# Patient Record
Sex: FEMALE | Race: WHITE | NOT HISPANIC OR LATINO | Employment: UNEMPLOYED | URBAN - NONMETROPOLITAN AREA
[De-identification: names, ages, dates, MRNs, and addresses within clinical notes are randomized per-mention and may not be internally consistent; named-entity substitution may affect disease eponyms.]

---

## 2017-02-10 ENCOUNTER — TRANSCRIBE ORDERS (OUTPATIENT)
Dept: LAB | Facility: MEDICAL CENTER | Age: 57
End: 2017-02-10

## 2017-02-10 ENCOUNTER — APPOINTMENT (OUTPATIENT)
Dept: LAB | Facility: MEDICAL CENTER | Age: 57
End: 2017-02-10
Payer: COMMERCIAL

## 2017-02-10 ENCOUNTER — HOSPITAL ENCOUNTER (OUTPATIENT)
Dept: RADIOLOGY | Facility: MEDICAL CENTER | Age: 57
Discharge: HOME | End: 2017-02-10
Payer: COMMERCIAL

## 2017-02-10 DIAGNOSIS — R07.81 RIB PAIN ON LEFT SIDE: ICD-10-CM

## 2017-02-10 DIAGNOSIS — R10.12 LEFT UPPER QUADRANT PAIN: ICD-10-CM

## 2017-02-10 DIAGNOSIS — D64.9 ANEMIA, UNSPECIFIED: ICD-10-CM

## 2017-02-10 DIAGNOSIS — E13.8 DIABETES MELLITUS OF OTHER TYPE WITH COMPLICATION: ICD-10-CM

## 2017-02-10 DIAGNOSIS — E78.5 HYPERLIPIDEMIA, UNSPECIFIED HYPERLIPIDEMIA TYPE: ICD-10-CM

## 2017-02-10 DIAGNOSIS — E87.8 ELECTROLYTE AND FLUID DISORDERS NOT ELSEWHERE CLASSIFIED: ICD-10-CM

## 2017-02-10 DIAGNOSIS — E13.8 DIABETES MELLITUS OF OTHER TYPE WITH COMPLICATION: Primary | ICD-10-CM

## 2017-02-10 DIAGNOSIS — E03.9 UNSPECIFIED HYPOTHYROIDISM: ICD-10-CM

## 2017-02-10 DIAGNOSIS — E55.9 UNSPECIFIED VITAMIN D DEFICIENCY: ICD-10-CM

## 2017-02-10 LAB
25(OH)D3 SERPL-MCNC: 21 NG/ML (ref 30–100)
ALBUMIN SERPL BCP-MCNC: 4 G/DL (ref 3.5–5)
ALP SERPL-CCNC: 100 U/L (ref 46–116)
ALT SERPL W P-5'-P-CCNC: 17 U/L (ref 12–78)
ANION GAP SERPL CALCULATED.3IONS-SCNC: 7 MMOL/L (ref 4–13)
AST SERPL W P-5'-P-CCNC: 11 U/L (ref 5–45)
BASOPHILS # BLD AUTO: 0.09 THOUSANDS/ΜL (ref 0–0.1)
BASOPHILS NFR BLD AUTO: 1 % (ref 0–1)
BILIRUB SERPL-MCNC: 0.53 MG/DL (ref 0.2–1)
BUN SERPL-MCNC: 14 MG/DL (ref 5–25)
CALCIUM SERPL-MCNC: 8.8 MG/DL (ref 8.3–10.1)
CHLORIDE SERPL-SCNC: 106 MMOL/L (ref 100–108)
CHOLEST SERPL-MCNC: 196 MG/DL (ref 50–200)
CO2 SERPL-SCNC: 25 MMOL/L (ref 21–32)
CREAT SERPL-MCNC: 0.79 MG/DL (ref 0.6–1.3)
EOSINOPHIL # BLD AUTO: 0.13 THOUSAND/ΜL (ref 0–0.61)
EOSINOPHIL NFR BLD AUTO: 1 % (ref 0–6)
ERYTHROCYTE [DISTWIDTH] IN BLOOD BY AUTOMATED COUNT: 13.4 % (ref 11.6–15.1)
EST. AVERAGE GLUCOSE BLD GHB EST-MCNC: 111 MG/DL
GFR SERPL CREATININE-BSD FRML MDRD: >60 ML/MIN/1.73SQ M
GLUCOSE SERPL-MCNC: 82 MG/DL (ref 65–140)
HBA1C MFR BLD: 5.5 % (ref 4.2–6.3)
HCT VFR BLD AUTO: 42.2 % (ref 34.8–46.1)
HDLC SERPL-MCNC: 41 MG/DL (ref 40–60)
HGB BLD-MCNC: 13.6 G/DL (ref 11.5–15.4)
INSULIN SERPL-ACNC: 11.8 MU/L (ref 3–25)
LDLC SERPL CALC-MCNC: 108 MG/DL (ref 0–100)
LYMPHOCYTES # BLD AUTO: 2.61 THOUSANDS/ΜL (ref 0.6–4.47)
LYMPHOCYTES NFR BLD AUTO: 29 % (ref 14–44)
MCH RBC QN AUTO: 29.2 PG (ref 26.8–34.3)
MCHC RBC AUTO-ENTMCNC: 32.2 G/DL (ref 31.4–37.4)
MCV RBC AUTO: 91 FL (ref 82–98)
MONOCYTES # BLD AUTO: 0.54 THOUSAND/ΜL (ref 0.17–1.22)
MONOCYTES NFR BLD AUTO: 6 % (ref 4–12)
NEUTROPHILS # BLD AUTO: 5.61 THOUSANDS/ΜL (ref 1.85–7.62)
NEUTS SEG NFR BLD AUTO: 63 % (ref 43–75)
NRBC BLD AUTO-RTO: 0 /100 WBCS
PLATELET # BLD AUTO: 380 THOUSANDS/UL (ref 149–390)
PMV BLD AUTO: 11.2 FL (ref 8.9–12.7)
POTASSIUM SERPL-SCNC: 4 MMOL/L (ref 3.5–5.3)
PROT SERPL-MCNC: 7.8 G/DL (ref 6.4–8.2)
RBC # BLD AUTO: 4.65 MILLION/UL (ref 3.81–5.12)
SODIUM SERPL-SCNC: 138 MMOL/L (ref 136–145)
TRIGL SERPL-MCNC: 237 MG/DL
TSH SERPL DL<=0.05 MIU/L-ACNC: 1.39 UIU/ML (ref 0.36–3.74)
VIT B12 SERPL-MCNC: 435 PG/ML (ref 100–900)
WBC # BLD AUTO: 8.99 THOUSAND/UL (ref 4.31–10.16)

## 2017-02-10 PROCEDURE — 80053 COMPREHEN METABOLIC PANEL: CPT

## 2017-02-10 PROCEDURE — 82306 VITAMIN D 25 HYDROXY: CPT

## 2017-02-10 PROCEDURE — 74020 HB X-RAY EXAM OF ABDOMEN (COMPLETE, WITH DECUBITUS/ERECT VIEWS): CPT

## 2017-02-10 PROCEDURE — 83525 ASSAY OF INSULIN: CPT

## 2017-02-10 PROCEDURE — 80061 LIPID PANEL: CPT

## 2017-02-10 PROCEDURE — 84443 ASSAY THYROID STIM HORMONE: CPT

## 2017-02-10 PROCEDURE — 85025 COMPLETE CBC W/AUTO DIFF WBC: CPT

## 2017-02-10 PROCEDURE — 83036 HEMOGLOBIN GLYCOSYLATED A1C: CPT

## 2017-02-10 PROCEDURE — 36415 COLL VENOUS BLD VENIPUNCTURE: CPT

## 2017-02-10 PROCEDURE — 82607 VITAMIN B-12: CPT

## 2017-02-10 PROCEDURE — 71020 HB CHEST X-RAY 2VW FRONTAL&LATL: CPT

## 2017-03-01 ENCOUNTER — TRANSCRIBE ORDERS (OUTPATIENT)
Dept: ADMINISTRATIVE | Facility: HOSPITAL | Age: 57
End: 2017-03-01

## 2017-03-01 DIAGNOSIS — Z12.31 VISIT FOR SCREENING MAMMOGRAM: Primary | ICD-10-CM

## 2017-06-11 ENCOUNTER — HOSPITAL ENCOUNTER (EMERGENCY)
Facility: HOSPITAL | Age: 57
Discharge: HOME | End: 2017-06-11
Attending: EMERGENCY MEDICINE | Admitting: EMERGENCY MEDICINE
Payer: COMMERCIAL

## 2017-06-11 VITALS
OXYGEN SATURATION: 96 % | TEMPERATURE: 98.1 F | HEIGHT: 62 IN | HEART RATE: 92 BPM | BODY MASS INDEX: 30.55 KG/M2 | SYSTOLIC BLOOD PRESSURE: 143 MMHG | DIASTOLIC BLOOD PRESSURE: 67 MMHG | RESPIRATION RATE: 18 BRPM | WEIGHT: 166 LBS

## 2017-06-11 DIAGNOSIS — S93.402A LEFT ANKLE SPRAIN: Primary | ICD-10-CM

## 2017-06-11 PROCEDURE — 96372 THER/PROPH/DIAG INJ SC/IM: CPT

## 2017-06-11 PROCEDURE — 99283 EMERGENCY DEPT VISIT LOW MDM: CPT

## 2017-06-11 RX ORDER — PRAVASTATIN SODIUM 10 MG
20 TABLET ORAL DAILY
COMMUNITY
End: 2018-03-20 | Stop reason: SDUPTHER

## 2017-06-11 RX ORDER — KETOROLAC TROMETHAMINE 30 MG/ML
15 INJECTION, SOLUTION INTRAMUSCULAR; INTRAVENOUS ONCE
Status: COMPLETED | OUTPATIENT
Start: 2017-06-11 | End: 2017-06-11

## 2017-06-11 RX ORDER — LISINOPRIL 30 MG/1
30 TABLET ORAL DAILY
COMMUNITY
End: 2018-03-20 | Stop reason: SDUPTHER

## 2017-06-11 RX ORDER — LORATADINE 10 MG/1
10 TABLET ORAL DAILY
COMMUNITY
End: 2018-03-20 | Stop reason: SDUPTHER

## 2017-06-11 RX ORDER — FLUTICASONE PROPIONATE 50 MCG
1 SPRAY, SUSPENSION (ML) NASAL DAILY
COMMUNITY
End: 2018-03-20 | Stop reason: SDUPTHER

## 2017-06-11 RX ORDER — TRAMADOL HYDROCHLORIDE 50 MG/1
50 TABLET ORAL EVERY 6 HOURS PRN
COMMUNITY
End: 2018-03-20 | Stop reason: SDUPTHER

## 2017-06-11 RX ORDER — BACLOFEN 10 MG/1
10 TABLET ORAL 3 TIMES DAILY
COMMUNITY
End: 2018-03-20 | Stop reason: SDUPTHER

## 2017-06-11 RX ORDER — IBUPROFEN 600 MG/1
600 TABLET ORAL EVERY 6 HOURS PRN
Qty: 30 TABLET | Refills: 0 | Status: SHIPPED | OUTPATIENT
Start: 2017-06-11 | End: 2018-06-26 | Stop reason: SDUPTHER

## 2017-06-11 RX ADMIN — KETOROLAC TROMETHAMINE 15 MG: 30 INJECTION, SOLUTION INTRAMUSCULAR at 17:26

## 2017-06-27 ENCOUNTER — ALLSCRIPTS OFFICE VISIT (OUTPATIENT)
Dept: OTHER | Facility: OTHER | Age: 57
End: 2017-06-27

## 2017-06-27 DIAGNOSIS — Z12.31 ENCOUNTER FOR SCREENING MAMMOGRAM FOR MALIGNANT NEOPLASM OF BREAST: ICD-10-CM

## 2017-06-27 PROCEDURE — 87624 HPV HI-RISK TYP POOLED RSLT: CPT | Performed by: NURSE PRACTITIONER

## 2017-06-27 PROCEDURE — G0145 SCR C/V CYTO,THINLAYER,RESCR: HCPCS | Performed by: NURSE PRACTITIONER

## 2017-06-29 ENCOUNTER — LAB REQUISITION (OUTPATIENT)
Dept: LAB | Facility: HOSPITAL | Age: 57
End: 2017-06-29
Payer: COMMERCIAL

## 2017-06-29 DIAGNOSIS — Z01.419 ENCOUNTER FOR GYNECOLOGICAL EXAMINATION WITHOUT ABNORMAL FINDING: ICD-10-CM

## 2017-07-06 LAB — HPV RRNA GENITAL QL NAA+PROBE: ABNORMAL

## 2017-07-12 LAB
LAB AP GYN PRIMARY INTERPRETATION: NORMAL
Lab: NORMAL
PATH INTERP SPEC-IMP: NORMAL

## 2017-08-10 ENCOUNTER — TRANSCRIBE ORDERS (OUTPATIENT)
Dept: LAB | Facility: MEDICAL CENTER | Age: 57
End: 2017-08-10

## 2017-08-10 ENCOUNTER — APPOINTMENT (OUTPATIENT)
Dept: LAB | Facility: MEDICAL CENTER | Age: 57
End: 2017-08-10
Payer: COMMERCIAL

## 2017-08-10 DIAGNOSIS — D64.9 ANEMIA, UNSPECIFIED: ICD-10-CM

## 2017-08-10 DIAGNOSIS — E78.5 OTHER AND UNSPECIFIED HYPERLIPIDEMIA: ICD-10-CM

## 2017-08-10 DIAGNOSIS — E88.81 DYSMETABOLIC SYNDROME X: ICD-10-CM

## 2017-08-10 DIAGNOSIS — E13.9 OTHER SPECIFIED DIABETES MELLITUS: Primary | ICD-10-CM

## 2017-08-10 DIAGNOSIS — E03.9 UNSPECIFIED HYPOTHYROIDISM: ICD-10-CM

## 2017-08-10 DIAGNOSIS — E53.8 OTHER B-COMPLEX DEFICIENCIES: ICD-10-CM

## 2017-08-10 DIAGNOSIS — E13.9 OTHER SPECIFIED DIABETES MELLITUS: ICD-10-CM

## 2017-08-10 DIAGNOSIS — E55.9 UNSPECIFIED VITAMIN D DEFICIENCY: ICD-10-CM

## 2017-08-10 DIAGNOSIS — E87.8 ELECTROLYTE AND FLUID DISORDERS NOT ELSEWHERE CLASSIFIED: ICD-10-CM

## 2017-08-10 LAB
25(OH)D3 SERPL-MCNC: 36 NG/ML (ref 30–100)
ALBUMIN SERPL BCP-MCNC: 4.1 G/DL (ref 3.5–5)
ALP SERPL-CCNC: 102 U/L (ref 46–116)
ALT SERPL W P-5'-P-CCNC: 17 U/L (ref 12–78)
ANION GAP SERPL CALCULATED.3IONS-SCNC: 8 MMOL/L (ref 4–13)
AST SERPL W P-5'-P-CCNC: 14 U/L (ref 5–45)
BASOPHILS # BLD AUTO: 0.07 THOUSANDS/ΜL (ref 0–0.1)
BASOPHILS NFR BLD AUTO: 1 % (ref 0–1)
BILIRUB SERPL-MCNC: 0.56 MG/DL (ref 0.2–1)
BUN SERPL-MCNC: 17 MG/DL (ref 5–25)
CALCIUM SERPL-MCNC: 9.1 MG/DL (ref 8.3–10.1)
CHLORIDE SERPL-SCNC: 106 MMOL/L (ref 100–108)
CHOLEST SERPL-MCNC: 211 MG/DL (ref 50–200)
CO2 SERPL-SCNC: 25 MMOL/L (ref 21–32)
CREAT SERPL-MCNC: 0.81 MG/DL (ref 0.6–1.3)
EOSINOPHIL # BLD AUTO: 0.2 THOUSAND/ΜL (ref 0–0.61)
EOSINOPHIL NFR BLD AUTO: 2 % (ref 0–6)
ERYTHROCYTE [DISTWIDTH] IN BLOOD BY AUTOMATED COUNT: 13.7 % (ref 11.6–15.1)
EST. AVERAGE GLUCOSE BLD GHB EST-MCNC: 114 MG/DL
GFR SERPL CREATININE-BSD FRML MDRD: 83 ML/MIN/1.73SQ M
GLUCOSE P FAST SERPL-MCNC: 85 MG/DL (ref 65–99)
HBA1C MFR BLD: 5.6 % (ref 4.2–6.3)
HCT VFR BLD AUTO: 43.9 % (ref 34.8–46.1)
HDLC SERPL-MCNC: 37 MG/DL (ref 40–60)
HGB BLD-MCNC: 14.3 G/DL (ref 11.5–15.4)
INSULIN SERPL-ACNC: 10.3 MU/L (ref 3–25)
LDLC SERPL CALC-MCNC: 124 MG/DL (ref 0–100)
LYMPHOCYTES # BLD AUTO: 2.71 THOUSANDS/ΜL (ref 0.6–4.47)
LYMPHOCYTES NFR BLD AUTO: 32 % (ref 14–44)
MCH RBC QN AUTO: 29.7 PG (ref 26.8–34.3)
MCHC RBC AUTO-ENTMCNC: 32.6 G/DL (ref 31.4–37.4)
MCV RBC AUTO: 91 FL (ref 82–98)
MONOCYTES # BLD AUTO: 0.69 THOUSAND/ΜL (ref 0.17–1.22)
MONOCYTES NFR BLD AUTO: 8 % (ref 4–12)
NEUTROPHILS # BLD AUTO: 4.92 THOUSANDS/ΜL (ref 1.85–7.62)
NEUTS SEG NFR BLD AUTO: 57 % (ref 43–75)
NRBC BLD AUTO-RTO: 0 /100 WBCS
PLATELET # BLD AUTO: 369 THOUSANDS/UL (ref 149–390)
PMV BLD AUTO: 11.3 FL (ref 8.9–12.7)
POTASSIUM SERPL-SCNC: 3.9 MMOL/L (ref 3.5–5.3)
PROT SERPL-MCNC: 7.9 G/DL (ref 6.4–8.2)
RBC # BLD AUTO: 4.82 MILLION/UL (ref 3.81–5.12)
SODIUM SERPL-SCNC: 139 MMOL/L (ref 136–145)
TRIGL SERPL-MCNC: 249 MG/DL
TSH SERPL DL<=0.05 MIU/L-ACNC: 1.02 UIU/ML (ref 0.36–3.74)
VIT B12 SERPL-MCNC: 396 PG/ML (ref 100–900)
WBC # BLD AUTO: 8.61 THOUSAND/UL (ref 4.31–10.16)

## 2017-08-10 PROCEDURE — 85025 COMPLETE CBC W/AUTO DIFF WBC: CPT

## 2017-08-10 PROCEDURE — 84443 ASSAY THYROID STIM HORMONE: CPT

## 2017-08-10 PROCEDURE — 83036 HEMOGLOBIN GLYCOSYLATED A1C: CPT

## 2017-08-10 PROCEDURE — 80061 LIPID PANEL: CPT

## 2017-08-10 PROCEDURE — 83525 ASSAY OF INSULIN: CPT

## 2017-08-10 PROCEDURE — 82306 VITAMIN D 25 HYDROXY: CPT

## 2017-08-10 PROCEDURE — 36415 COLL VENOUS BLD VENIPUNCTURE: CPT

## 2017-08-10 PROCEDURE — 82607 VITAMIN B-12: CPT

## 2017-08-10 PROCEDURE — 80053 COMPREHEN METABOLIC PANEL: CPT

## 2017-10-25 ENCOUNTER — TRANSCRIBE ORDERS (OUTPATIENT)
Dept: ADMINISTRATIVE | Facility: HOSPITAL | Age: 57
End: 2017-10-25

## 2017-10-25 DIAGNOSIS — R10.9 STOMACH ACHE: Primary | ICD-10-CM

## 2017-11-29 ENCOUNTER — HOSPITAL ENCOUNTER (OUTPATIENT)
Dept: ULTRASOUND IMAGING | Facility: HOSPITAL | Age: 57
Discharge: HOME | End: 2017-11-29
Payer: COMMERCIAL

## 2017-11-29 ENCOUNTER — HOSPITAL ENCOUNTER (OUTPATIENT)
Dept: MAMMOGRAPHY | Facility: HOSPITAL | Age: 57
Discharge: HOME | End: 2017-11-29
Payer: COMMERCIAL

## 2017-11-29 DIAGNOSIS — Z12.31 ENCOUNTER FOR SCREENING MAMMOGRAM FOR MALIGNANT NEOPLASM OF BREAST: ICD-10-CM

## 2017-11-29 PROCEDURE — G0202 SCR MAMMO BI INCL CAD: HCPCS

## 2017-11-29 PROCEDURE — 76642 ULTRASOUND BREAST LIMITED: CPT

## 2017-11-29 PROCEDURE — 77063 BREAST TOMOSYNTHESIS BI: CPT

## 2017-12-07 NOTE — PROGRESS NOTES
Left a message for Ms Gonzalez on 15/66/29 after a biopsy was recommended for her on 11/29/17  Wanted to assist in scheduling her biopsy  She did not return call  Spoke with Milka Butler at [facility] office on 12/6/17 and informed her Ms Wild Alberto did not return my call to schedule her recommended biopsy or schedule an appointment with a surgeon to my knowledge

## 2017-12-20 ENCOUNTER — ALLSCRIPTS OFFICE VISIT (OUTPATIENT)
Dept: OTHER | Facility: OTHER | Age: 57
End: 2017-12-20

## 2017-12-20 DIAGNOSIS — N63.20 MASS OF LEFT BREAST: ICD-10-CM

## 2017-12-21 NOTE — CONSULTS
Assessment   1  Breast mass, left (663 54) (N63 20)    Plan   Breast mass, left    · US GUIDED BREAST BIOPSY LEFT COMPLETE; Status:Hold For - Scheduling;    Requested for:33Als4845;    Perform:[facility]; Due:17Rpb1559; Ordered; For:Breast mass, left; Ordered By:Vinny Rainey;   · Follow-up visit in 2 weeks Evaluation and Treatment  Follow-up  Status: Hold For -    Scheduling  Requested for: 11Gks2373   Ordered; For: Breast mass, left; Ordered By: Maria Elena Stover Performed:  Due: 37COX5173    Discussion/Summary   Discussion Summary:    Left breast mass / cyst, aspirated today  It is unclear whether this is the primary problem versus a cyst masking the problem  We will therefore set her for ultrasound-guided biopsy of left breast mass presuming is still there on preprocedural ultrasound  I will see her in 2 weeks after the results are available for review  Further management to be determined based on results of study  Goals and Barriers: The patient has the current Goals: Diagnosis  The patent has the current Barriers:    Patient's Capacity to Self-Care: Patient is able to Self-Care  Patient agrees and allows to involve family/caregiver in development of care plan:      Chief Complaint   Chief Complaint Free Text Note Form: Patient here for surgical consult left breast mass      History of Present Illness   HPI: Patient is a 28-year-old woman who was in her usual state of health until a few weeks ago when she underwent her mammogram  Right breast was clear  The left breast was notable for a cyst  Since the mammogram, she notes that the swelling left breast has progressively enlarged  She comes in for evaluation and treatment  The mammogram and ultrasound done at the time were deemed BI-RADS 5 and therefore biopsy was recommended  She denies a prior history of breast cancer or breast biopsies  She has denies nipple discharge  She has never had a breast biopsy   She does perform monthly self-breast exams  Age of menarche 17  no pregnancies  She has birth control pills for 29 years  Never used hormone replacement therapy  Her mother had breast cancer in her 62s  fevers, chills or evidence of infection  Review of Systems   Complete Female ROS SurgOnc:      Constitutional: The patient denies new or recent history of general fatigue, no recent weight loss, no change in appetite  Eyes: No complaints of visual problems, no scleral icterus  ENT: no complaints of ear pain, no hoarseness, no difficulty swallowing,-- no tinnitus-- and-- no new masses in head, oral cavity, or neck  Cardiovascular: No complaints of chest pain, no palpitations, no ankle edema  Respiratory: No complaints of shortness of breath, no cough  Gastrointestinal: No complaints of jaundice, no bloody stools, no pale stools  Genitourinary: No complaints of dysuria, no hematuria, no nocturia, no frequent urination, no urethral discharge  Musculoskeletal: No complaints of weakness, paralysis, joint stiffness or arthralgias,  Integumentary: No complaints of rash, no new lesions  Neurological: No complaints of convulsions, no seizures, no dizziness  Hematologic/Lymphatic: as noted in HPI  ROS Reviewed:    ROS reviewed  Active Problems   1  Bilateral knee pain (719 46) (M25 561,M25 562)   2  Bilateral leg weakness (729 89) (R29 898)   3  Encounter for gynecological examination with Papanicolaou smear of cervix (V72 31)     (Z01 419)   4  Encounter for screening mammogram for malignant neoplasm of breast (V76 12)     (Z12 31)   5  Joint pain (719 40) (M25 50)   6  Myalgia (729 1) (M79 1)    Past Medical History   Active Problems And Past Medical History Reviewed: The active problems and past medical history were reviewed and updated today  Surgical History   1  History of Appendectomy  Surgical History Reviewed: The surgical history was reviewed and updated today         Family History   Mother    1  No pertinent family history  Family History    2  Maternal history of Breast cancer (174 9) (C50 919)   3  Maternal grandfather's history of Diabetes (250 00) (E11 9)   4  Paternal history of Hypertension (401 9) (I10)   5  Maternal history of Uterine cancer (179) (C55)  Family History Reviewed: The family history was reviewed and updated today  Social History    · Current smoker (305 1) (F17 200)   · Daily caffeinated cola consumption   · Drinks coffee   · Tea  Social History Reviewed: The social history was reviewed and updated today  The social history was reviewed and is unchanged  Current Meds    1  Baclofen 10 MG Oral Tablet; Therapy: (Recorded:90Xru4249) to Recorded   2  Calcium 600+D Plus Minerals 600-400 MG-UNIT Oral Tablet; Therapy: (Recorded:00Wyu9964) to Recorded   3  Fluticasone Propionate 50 MCG/ACT Nasal Suspension; Therapy: (Recorded:85Hqa8523) to Recorded   4  Lisinopril 30 MG Oral Tablet; Therapy: (Recorded:38Xka4376) to Recorded   5  Loratadine 10 MG Oral Capsule; Therapy: (Recorded:72Yiu8378) to Recorded   6  Lovastatin 20 MG Oral Tablet; Therapy: (Recorded:97Mdb2499) to Recorded   7  RaNITidine HCl - 150 MG Oral Tablet; Therapy: (Recorded:10Bmc4939) to Recorded   8  Tab-A-Theodore TABS; Therapy: (Recorded:98Wam0626) to Recorded   9  TraMADol HCl - 50 MG Oral Tablet; Therapy: (Recorded:22Kat1177) to Recorded   10  Vitamin D TABS; Therapy: (Recorded:31Ixe4223) to Recorded   11  V-R Vitamin B-12 TABS; Therapy: (Recorded:40Xfg5541) to Recorded  Medication List Reviewed: The medication list was reviewed and updated today  Allergies   1  No Known Drug Allergies  2  No Known Environmental Allergies   3   No Known Food Allergies    Vitals   Vital Signs    Recorded: 99Cvs9520 03:13PM   Heart Rate 74   Respiration 16   Systolic 065   Diastolic 442   Height 5 ft 2 in   Weight 152 lb    BMI Calculated 27 8   BSA Calculated 1 7   O2 Saturation 98     Physical Exam        HEENT: Head and face: Normal  -- LILIANA, EOMI and the sclerae are anicteric  -- External inspection of ears and nose: Normal  -- There is no appreciable cervical adenopathy   -- There are no carotid bruits  -- Neck is supple without adenopathy  Chest: There are bilaterally symmetrical breath sounds  -- Palpation of heart: Normal PMI, no thrills  -- There is a RRR, normal S1 and S2, without murmurs, rub or gallop  Abdomen: The abdomen is normal to inspection and percussion  It is soft, non-tender  There are normal bowel sounds  There are no abnormal masses  Extremities: Gait and station: Normal  -- There is no clubbing or cyanosis  -- Inspection/palpation of joints, bones, and muscles: Normal  -- There is no edema  -- Sensation is intact to light touch  Neuro: Grossly nonfocal  -- Reflexes: 2+ and symmetric  -- Motor strength: Normal motor strength    -- Orientation to person, place and time: Normal  -- Mood and affect: Normal        Lymphatic: no evidence of cervical adenopathy bilaterally  -- no evidence of axillary adenopathy bilaterally  Skin: Examination of Breast: Abnormal        Results/Data   Diagnostic Studies Reviewed Surg Onc:      X-ray Review MAMMO SCREENING BILATERAL W 3D & CAD Final   Documents Attached   Order Number: [id number]   - Patient Instructions: To schedule this appointment, please contact Central Scheduling at [phone]  Do not wear any perfume, powder, lotion or deodorant on breast or underarm area  Please bring your doctors order, referral (if needed) and insurance information with you on the day of the test  Failure to bring this information may result in this test being rescheduled  Arrive 15 minutes prior to your appointment time to register  On the day of your test, please bring any prior mammogram or breast studies with you that were not performed at a [facility]   Failure to bring prior exams may result in your test needing to be rescheduled  Patient History:   Patient is postmenopausal and is nulliparous  Family history of breast cancer in mother  Reason for exam: high-risk patient  Mammo Screening Bilateral W DBT and CAD: November 29, 2017 -   Check In #: [de-identified]   2D/3D Procedure   3D views: Bilateral MLO view(s) were taken  2D views: Bilateral CC view(s) were taken  XCCL view(s) were   taken of the left breast        Technologist: DADA Hendrix (DADA)(M)   Prior study comparison: May 26, 2016, mammo diagnostic left W   CAD, performed at [address]  May 26, 2016, left breast   ultrasound, performed at [facility]  May 5, 2016, mammo   screening bilateral W CAD, performed at [facility]  August 25, 2008, mammo diagnostic right W CAD, performed at [address] [place] [address]  August 21, 2008, mammo screening bilateral W CAD,   performed at [address] [facility]  There are scattered fibroglandular densities  No dominant soft tissue mass, architectural distortion or   suspicious calcifications are noted in the right breast  The   skin and nipple contours are within normal limits  Dominant mass is present in the outer left breast  Targeted   ultrasound recommended  US Breast Left Limited: November 29, 2017 - Check In #: [de-identified]   Standard views  Technologist: Leatha Burrows, sonographer AKIRA CALDERA     ACR BI-RADS? ?? Assessments: BiRad:0 - Incomplete: needs additional   imaging evaluation (Overall)   3-D Scrn Mammo: BiRad:0 - incomplete: needs additional imaging   evaluation  A breast health care nurse from our facility will be contacting   the patient regarding the need for additional imaging  Recommendation:   Further imaging of the left breast      The patient is scheduled in a reminder system for screening   mammography       Transcription Location: [facility] 98: [id number]     Risk Value(s):   Neno 10 Year: 7 000%, Neno Lifetime: 22 000%,   Myriad Table: 1 5%, BIBIANA 5 Year: 2 3%, NCI Lifetime: 15 5%        by: Jose David Castanon Collected/Examined: [id number] 12:47PM  by: Jose David Castanon [id number] 09:57AM  Communication: No patient communication needed at this time; Final Resulted: 12ALF8001 02:14PM Last Updated: 71XTR5578 09:57AM Accession: [id number]      *US BREAST LEFT LIMITED (DIAGNOSTIC) Amended   Documents Attached   Order Number: [id number]   - Patient Instructions: To schedule this appointment, please contact Central Scheduling at [phone]  Do not wear any perfume, powder, lotion or deodorant on breast or underarm area  Please bring your doctors order, referral (if needed) and insurance information with you on the day of the test  Failure to bring this information may result in this test being rescheduled  Arrive 15 minutes prior to your appointment time to register  On the day of your test, please bring any prior mammogram or breast studies with you that were not performed at a [facility]  Failure to bring prior exams may result in your test needing to be rescheduled  Patient History:   Patient is postmenopausal and is nulliparous  Family history of breast cancer in mother  Reason for exam: high-risk patient  US Breast Left Limited: November 29, 2017 - Check In #: [de-identified]   Standard views  Technologist: Peggy Scott, sonographer WERNER, LEOT   Prior study comparison: May 26, 2016, mammo diagnostic left W   CAD, performed at [facility]  May 26, 2016, left breast   ultrasound, performed at [facility]  May 5, 2016, mammo   screening bilateral W CAD, performed at [facility]  August 25, 2008, mammo diagnostic right W CAD, performed at [address] [place] [address]  August 21, 2008, mammo screening bilateral W CAD,   performed at [facility]         INDICATION: Callback for dominant mass in the outer left breast     FINDINGS: Targeted ultrasound targeted ultrasound through the 3 o'clock position of the left breast, 14 cm from the nipple   demonstrates a large (greater than 5 cm) predominantly cystic   mass correlating with a previously described mass recommended for   biopsy  No grossly suspicious axillary lymphadenopathy is   identified  ACR BI-RADS? ?? Assessments: BiRad:5 - Highly suggestive of   malignancy     Recommendation:   Ultrasound-guided biopsy of the left breast      The patient is scheduled in a reminder system for screening   mammography  Transcription Location: [facility] 98: [id number]     Risk Value(s):   Tyrer-Cuzick 10 Year: 7 000%, Tyrer-Cuzick Lifetime: 22 000%,   Myriad Table: 1 5%, BIBIANA 5 Year: 2 3%, NCI Lifetime: 15 5%   Patient History:   Patient is postmenopausal and is nulliparous  Family history of breast cancer in mother  Reason for exam: high-risk patient  Mammo Screening Bilateral W DBT and CAD: November 29, 2017 -   Check In #: [de-identified]   2D/3D Procedure   3D views: Bilateral MLO view(s) were taken  2D views: Bilateral CC view(s) were taken  XCCL view(s) were   taken of the left breast        Technologist: DADA Edouard (DADA)(M)   Prior study comparison: May 26, 2016, mammo diagnostic left W   CAD, performed at [facility]  May 26, 2016, left breast   ultrasound, performed at [facility]  May 5, 2016, mammo   screening bilateral W CAD, performed at [facility]  August 25, 2008, mammo diagnostic right W CAD, performed at [address] [place] [address]  August 21, 2008, mammo screening bilateral W CAD,   performed at [facility]  There are scattered fibroglandular densities  No dominant soft tissue mass, architectural distortion or   suspicious calcifications are noted in the right breast  The   skin and nipple contours are within normal limits  Dominant mass is present in the outer left breast  Targeted   ultrasound recommended       US Breast Left Limited: November 29, 2017 - Check In #: [de-identified]   Standard views  Technologist: Magi Dickinson, sonographer AKIRA CALDERA     ACR BI-RADS? ?? Assessments: BiRad:0 - Incomplete: needs additional   imaging evaluation (Overall)   3-D Scrn Mammo: BiRad:0 - incomplete: needs additional imaging   evaluation  A breast health care nurse from our facility will be contacting   the patient regarding the need for additional imaging  Recommendation:   Further imaging of the left breast      The patient is scheduled in a reminder system for screening   mammography  Transcription Location: [facility] 98: [id number]     Risk Value(s):   Tyrer-Cuzick 10 Year: 7 000%, Tyrer-Cuzick Lifetime: 22 000%,   Myriad Table: 1 5%, BIBIANA 5 Year: 2 3%, NCI Lifetime: 15 5%        by: Héctor Rivas Collected/Examined: [id number] 01:51PM  by: Héctor Rivas [id number] 12:39PM  Communication: No patient communication needed at this time; Amended Resulted: 52SEZ4646 11:48AM Last Updated: 07RUE9310 12:39PM Accession: [id number]  CT Scan Review MAMMO SCREENING BILATERAL W 3D & CAD Final   Documents Attached   Order Number: [id number]   - Patient Instructions: To schedule this appointment, please contact Central Scheduling at [phone]  Do not wear any perfume, powder, lotion or deodorant on breast or underarm area  Please bring your doctors order, referral (if needed) and insurance information with you on the day of the test  Failure to bring this information may result in this test being rescheduled  Arrive 15 minutes prior to your appointment time to register  On the day of your test, please bring any prior mammogram or breast studies with you that were not performed at a [facility]  Failure to bring prior exams may result in your test needing to be rescheduled  Patient History:   Patient is postmenopausal and is nulliparous  Family history of breast cancer in mother  Reason for exam: high-risk patient       Mammo Screening Bilateral W DBT and CAD: November 29, 2017 -   Check In #: [de-identified]   2D/3D Procedure   3D views: Bilateral MLO view(s) were taken  2D views: Bilateral CC view(s) were taken  XCCL view(s) were   taken of the left breast        Technologist: DADA Oliveira (R)(M)   Prior study comparison: May 26, 2016, mammo diagnostic left W   CAD, performed at [facility]  May 26, 2016, left breast   ultrasound, performed at [facility]  May 5, 2016, mammo   screening bilateral W CAD, performed at [facility]  August 25, 2008, mammo diagnostic right W CAD, performed at [address] [place] [address]  August 21, 2008, mammo screening bilateral W CAD,   performed at [facility] [employer] [facility]  There are scattered fibroglandular densities  No dominant soft tissue mass, architectural distortion or   suspicious calcifications are noted in the right breast  The   skin and nipple contours are within normal limits  Dominant mass is present in the outer left breast  Targeted   ultrasound recommended  US Breast Left Limited: November 29, 2017 - Check In #: [de-identified]   Standard views  Technologist: Paradise Skelton, sonographer WERNER, AKIRA     ACR BI-RADS? ?? Assessments: BiRad:0 - Incomplete: needs additional   imaging evaluation (Overall)   3-D Scrn Mammo: BiRad:0 - incomplete: needs additional imaging   evaluation  A breast health care nurse from our facility will be contacting   the patient regarding the need for additional imaging  Recommendation:   Further imaging of the left breast      The patient is scheduled in a reminder system for screening   mammography  Transcription Location: [facility] 98: [id number]     Risk Value(s):   Tyrer-Cuzick 10 Year: 7 000%, Tyrer-Cuzick Lifetime: 22 000%,   Myriad Table: 1 5%, BIBIANA 5 Year: 2 3%, NCI Lifetime: 15 5%        by: Juwan Hernandez Collected/Examined: [id number] 12:47PM  by: Juwan Hernandez [id number] 09:57AM  Communication: No patient communication needed at this time;  Final Resulted: [id number] 02:14PM Last Updated: 00UQY6206 09:57AM Accession: [id number]  Procedure   After consent was obtained, the overlying skin of the left breast was anesthetized and prepped  It was then aspirated with a 16 gauge needle  Approximately 70 cc of serosanguineous fluid was evacuated from the cyst cavity until clinical resolution of the mass/collection  A Band-Aid was placed afterwards        Signatures    Electronically signed by : Gracie Carlos MD; Dec 20 2017  4:01PM EST                       (Author)

## 2018-01-03 ENCOUNTER — GENERIC CONVERSION - ENCOUNTER (OUTPATIENT)
Dept: OTHER | Facility: OTHER | Age: 58
End: 2018-01-03

## 2018-01-14 VITALS — WEIGHT: 156.38 LBS | BODY MASS INDEX: 28.6 KG/M2 | DIASTOLIC BLOOD PRESSURE: 90 MMHG | SYSTOLIC BLOOD PRESSURE: 138 MMHG

## 2018-01-23 VITALS
DIASTOLIC BLOOD PRESSURE: 120 MMHG | BODY MASS INDEX: 27.97 KG/M2 | RESPIRATION RATE: 16 BRPM | OXYGEN SATURATION: 98 % | WEIGHT: 152 LBS | HEIGHT: 62 IN | SYSTOLIC BLOOD PRESSURE: 200 MMHG | HEART RATE: 74 BPM

## 2018-01-24 VITALS
BODY MASS INDEX: 27.97 KG/M2 | WEIGHT: 152 LBS | DIASTOLIC BLOOD PRESSURE: 90 MMHG | HEIGHT: 62 IN | SYSTOLIC BLOOD PRESSURE: 160 MMHG | OXYGEN SATURATION: 98 % | HEART RATE: 115 BPM | RESPIRATION RATE: 16 BRPM

## 2018-03-01 ENCOUNTER — TELEPHONE (OUTPATIENT)
Dept: FAMILY MEDICINE CLINIC | Facility: CLINIC | Age: 58
End: 2018-03-01

## 2018-03-01 DIAGNOSIS — H65.06 RECURRENT ACUTE SEROUS OTITIS MEDIA OF BOTH EARS: Primary | ICD-10-CM

## 2018-03-01 RX ORDER — AMOXICILLIN 500 MG/1
500 CAPSULE ORAL EVERY 8 HOURS SCHEDULED
Qty: 30 CAPSULE | Refills: 1 | Status: SHIPPED | OUTPATIENT
Start: 2018-03-01 | End: 2018-03-11

## 2018-03-01 NOTE — TELEPHONE ENCOUNTER
Rx for Amoxicillin 500 mg every 8 hours x 10 days #30 #1ref escribed to [address] per patient request to have on hand for her ear infections

## 2018-03-19 RX ORDER — CHOLECALCIFEROL (VITAMIN D3) 1250 MCG
1 CAPSULE ORAL 2 TIMES WEEKLY
COMMUNITY
End: 2018-03-20 | Stop reason: SDUPTHER

## 2018-03-19 RX ORDER — LANOLIN ALCOHOL/MO/W.PET/CERES
1 CREAM (GRAM) TOPICAL DAILY
COMMUNITY
End: 2018-03-20 | Stop reason: SDUPTHER

## 2018-03-19 RX ORDER — RANITIDINE 150 MG/1
1 TABLET ORAL 2 TIMES DAILY
COMMUNITY
End: 2018-03-20 | Stop reason: SDUPTHER

## 2018-03-20 ENCOUNTER — OFFICE VISIT (OUTPATIENT)
Dept: FAMILY MEDICINE CLINIC | Facility: CLINIC | Age: 58
End: 2018-03-20
Payer: COMMERCIAL

## 2018-03-20 DIAGNOSIS — R01.1 CARDIAC MURMUR: ICD-10-CM

## 2018-03-20 DIAGNOSIS — M54.50 CHRONIC BILATERAL LOW BACK PAIN WITHOUT SCIATICA: ICD-10-CM

## 2018-03-20 DIAGNOSIS — R73.9 HYPERGLYCEMIA: ICD-10-CM

## 2018-03-20 DIAGNOSIS — E55.9 VITAMIN D DEFICIENCY: ICD-10-CM

## 2018-03-20 DIAGNOSIS — G47.09 OTHER INSOMNIA: ICD-10-CM

## 2018-03-20 DIAGNOSIS — D50.9 IRON DEFICIENCY ANEMIA, UNSPECIFIED IRON DEFICIENCY ANEMIA TYPE: Primary | ICD-10-CM

## 2018-03-20 DIAGNOSIS — J30.2 CHRONIC SEASONAL ALLERGIC RHINITIS, UNSPECIFIED TRIGGER: ICD-10-CM

## 2018-03-20 DIAGNOSIS — S21.002S BREAST WOUND, LEFT, SEQUELA: ICD-10-CM

## 2018-03-20 DIAGNOSIS — E78.2 MIXED HYPERLIPIDEMIA: ICD-10-CM

## 2018-03-20 DIAGNOSIS — K21.9 GASTROESOPHAGEAL REFLUX DISEASE, ESOPHAGITIS PRESENCE NOT SPECIFIED: ICD-10-CM

## 2018-03-20 DIAGNOSIS — G89.29 CHRONIC BILATERAL LOW BACK PAIN WITHOUT SCIATICA: ICD-10-CM

## 2018-03-20 DIAGNOSIS — Z00.00 HEALTHCARE MAINTENANCE: ICD-10-CM

## 2018-03-20 DIAGNOSIS — Z13.29 SCREENING FOR HYPOTHYROIDISM: ICD-10-CM

## 2018-03-20 DIAGNOSIS — Z86.79 HISTORY OF MITRAL VALVE DISORDER: ICD-10-CM

## 2018-03-20 DIAGNOSIS — I10 ESSENTIAL HYPERTENSION: ICD-10-CM

## 2018-03-20 DIAGNOSIS — E53.8 VITAMIN B 12 DEFICIENCY: ICD-10-CM

## 2018-03-20 PROCEDURE — 99204 OFFICE O/P NEW MOD 45 MIN: CPT | Performed by: NURSE PRACTITIONER

## 2018-03-20 RX ORDER — BACLOFEN 10 MG/1
10 TABLET ORAL 3 TIMES DAILY
Qty: 90 TABLET | Refills: 5 | Status: SHIPPED | OUTPATIENT
Start: 2018-03-20 | End: 2018-06-26 | Stop reason: ALTCHOICE

## 2018-03-20 RX ORDER — LANOLIN ALCOHOL/MO/W.PET/CERES
3 CREAM (GRAM) TOPICAL
Qty: 30 TABLET | Refills: 5 | Status: SHIPPED | OUTPATIENT
Start: 2018-03-20 | End: 2018-06-26 | Stop reason: SDUPTHER

## 2018-03-20 RX ORDER — CHOLECALCIFEROL (VITAMIN D3) 1250 MCG
1 CAPSULE ORAL 2 TIMES WEEKLY
Qty: 8 CAPSULE | Refills: 5 | Status: SHIPPED | OUTPATIENT
Start: 2018-03-22 | End: 2018-06-26 | Stop reason: SDUPTHER

## 2018-03-20 RX ORDER — TRAMADOL HYDROCHLORIDE 50 MG/1
50 TABLET ORAL EVERY 6 HOURS PRN
Qty: 120 TABLET | Refills: 2 | Status: SHIPPED | OUTPATIENT
Start: 2018-03-20 | End: 2018-06-26 | Stop reason: ALTCHOICE

## 2018-03-20 RX ORDER — LISINOPRIL 30 MG/1
30 TABLET ORAL DAILY
Qty: 30 TABLET | Refills: 5 | Status: SHIPPED | OUTPATIENT
Start: 2018-03-20 | End: 2018-06-26 | Stop reason: SDUPTHER

## 2018-03-20 RX ORDER — LANOLIN ALCOHOL/MO/W.PET/CERES
1000 CREAM (GRAM) TOPICAL DAILY
Qty: 30 TABLET | Refills: 5 | Status: SHIPPED | OUTPATIENT
Start: 2018-03-20 | End: 2018-06-26 | Stop reason: SDUPTHER

## 2018-03-20 RX ORDER — LORATADINE 10 MG/1
10 TABLET ORAL DAILY
Qty: 30 TABLET | Refills: 5 | Status: SHIPPED | OUTPATIENT
Start: 2018-03-20 | End: 2018-06-26 | Stop reason: SDUPTHER

## 2018-03-20 RX ORDER — PRAVASTATIN SODIUM 10 MG
20 TABLET ORAL DAILY
Qty: 60 TABLET | Refills: 5 | Status: SHIPPED | OUTPATIENT
Start: 2018-03-20 | End: 2018-04-02 | Stop reason: CLARIF

## 2018-03-20 RX ORDER — ELECTROLYTES/DEXTROSE
1 SOLUTION, ORAL ORAL DAILY
Qty: 30 TABLET | Refills: 5 | Status: SHIPPED | OUTPATIENT
Start: 2018-03-20 | End: 2018-06-26 | Stop reason: SDUPTHER

## 2018-03-20 RX ORDER — LANOLIN ALCOHOL/MO/W.PET/CERES
3 CREAM (GRAM) TOPICAL
COMMUNITY
End: 2018-03-20 | Stop reason: SDUPTHER

## 2018-03-20 RX ORDER — RANITIDINE 150 MG/1
150 TABLET ORAL 2 TIMES DAILY
Qty: 60 TABLET | Refills: 5 | Status: SHIPPED | OUTPATIENT
Start: 2018-03-20 | End: 2018-06-26 | Stop reason: SDUPTHER

## 2018-03-20 RX ORDER — CLINDAMYCIN HYDROCHLORIDE 300 MG/1
300 CAPSULE ORAL 4 TIMES DAILY
Qty: 40 CAPSULE | Refills: 1 | Status: SHIPPED | OUTPATIENT
Start: 2018-03-20 | End: 2018-03-30

## 2018-03-20 RX ORDER — FLUTICASONE PROPIONATE 50 MCG
1 SPRAY, SUSPENSION (ML) NASAL DAILY
Qty: 16 G | Refills: 0 | Status: SHIPPED | OUTPATIENT
Start: 2018-03-20 | End: 2018-06-26 | Stop reason: SDUPTHER

## 2018-03-20 NOTE — PROGRESS NOTES
Assessment/Plan:      Diagnoses and all orders for this visit:    Iron deficiency anemia, unspecified iron deficiency anemia type  -     CBC and differential; Future    Screening for hypothyroidism  -     TSH, 3rd generation with T4 reflex; Future    Mixed hyperlipidemia  -     Lipid panel; Future  -     pravastatin (PRAVACHOL) 10 mg tablet; Take 2 tablets (20 mg total) by mouth daily    Vitamin B 12 deficiency  -     Vitamin B12; Future  -     cyanocobalamin (VITAMIN B-12) 1,000 mcg tablet; Take 1 tablet (1,000 mcg total) by mouth daily    Vitamin D deficiency  -     Vitamin D 25 hydroxy  -     Cholecalciferol (VITAMIN D3) 07056 units CAPS; Take 1 capsule (50,000 Units total) by mouth 2 (two) times a week  -     Calcium Carbonate-Vitamin D3 (CALCIUM 600/VITAMIN D) 600-400 MG-UNIT TABS; Take 1 tablet by mouth 2 (two) times a day    Hyperglycemia  -     Comprehensive metabolic panel; Future  -     HEMOGLOBIN A1C W/ EAG ESTIMATION; Future  -     Insulin, fasting; Future    History of mitral valve disorder  -     Echo limited with contrast if indicated; Future    Cardiac murmur  -     Echo limited with contrast if indicated; Future    Breast wound, left, sequela  -     Ambulatory referral to General Surgery; Future  -     clindamycin (CLEOCIN) 300 MG capsule; Take 1 capsule (300 mg total) by mouth 4 (four) times a day for 10 days  -     traMADol (ULTRAM) 50 mg tablet; Take 1 tablet (50 mg total) by mouth every 6 (six) hours as needed for moderate pain    Chronic bilateral low back pain without sciatica  -     traMADol (ULTRAM) 50 mg tablet; Take 1 tablet (50 mg total) by mouth every 6 (six) hours as needed for moderate pain  -     baclofen 10 mg tablet; Take 1 tablet (10 mg total) by mouth 3 (three) times a day    Gastroesophageal reflux disease, esophagitis presence not specified  -     ranitidine (ZANTAC) 150 mg tablet;  Take 1 tablet (150 mg total) by mouth 2 (two) times a day    Healthcare maintenance  - Multiple Vitamins-Minerals (MULTIVITAMIN ADULT) TABS; Take 1 tablet by mouth daily    Other insomnia  -     melatonin 3 mg; Take 1 tablet (3 mg total) by mouth daily at bedtime    Chronic seasonal allergic rhinitis, unspecified trigger  -     loratadine (CLARITIN) 10 mg tablet; Take 1 tablet (10 mg total) by mouth daily  -     fluticasone (FLONASE) 50 mcg/act nasal spray; 1 spray into each nostril daily    Essential hypertension  -     lisinopril (ZESTRIL) 30 mg tablet; Take 1 tablet (30 mg total) by mouth daily    Other orders  -     Discontinue: cyanocobalamin (VITAMIN B-12) 1,000 mcg tablet; Take 1 tablet by mouth daily  -     Discontinue: Cholecalciferol (VITAMIN D3) 25811 units CAPS; Take 1 capsule by mouth 2 (two) times a week  -     Discontinue: Multiple Vitamins-Minerals (MULTIVITAMIN ADULT PO); Take 1 tablet by mouth daily  -     Discontinue: ranitidine (ZANTAC) 150 mg tablet; Take 1 tablet by mouth 2 (two) times a day  -     Discontinue: Calcium Carbonate-Vitamin D3 (CALCIUM 600/VITAMIN D) 600-400 MG-UNIT TABS; Take 1 tablet by mouth 2 (two) times a day  -     Discontinue: melatonin 3 mg; Take 3 mg by mouth daily at bedtime          Subjective:     Patient ID: Chris Patterson is a 56 y o  female  Patient presents to office for initial physical exam and to establish care at [place] [id number]  Complete history and medications reviewed with patient and tolerating all medications well  Patient had Mammogram completed in November 2017 which suggested biopsy of left breast cyst/mass  Patient had I&D done of left breast cyst in Dec  2017 by [age] [facility] Surgeon Dr Kraig Nino who suggested patient have a biopsy done of left breast mass along with follow up Mammogram but patient is refusing to have a biopsy or Mammogram done  Patient instructed on importance of scheduling appointment for Biopsy and Mammogram   Patient C/O left breast cyst I&D site still has a hole with bloody drainage continuing  Patient was evaluated at [facility] 2/13/18 for the cyst of left breast draining blood  [facility] labwork which showed elevated WBCs and prescribed antibiotics  Patient did not have her labwork completed yet  Patient C/O sinus congestion for brown/green mucous, B/L ear pressure and pain, did have sore throat a couple days, and productive cough for green mucous ongoing for past week  Patient never had her Echocardiogram done as ordered  Review of Systems    GENERAL:  Feels well, denies any significant changes in weight without trying  SKIN:  Denies rashes, lesions, opened areas, wounds, change in moles or any other skin changes  Opened are of left lateral breast from recent breast cyst I&D  HEENT:  Denies any head injury or headaches  Negative blurred vision, floaters, spots before eyes, infections, or other vision problems  Negative significant changes in vision or hearing  Negative tinnitus, vertigo, or infections  Negative hay fever, sinus trouble, nasal discharge, bloody noses, or problems with smell  Negative sore throat, bleeding gums, ulcers, or sores  Glasses/Contacts: Glasses  Hearing Aids: NO  Dentures/Partials/Implants: NO  NECK:  Denies lumps, goiter, pain, swollen glands, or lymphadenopathy  BREASTS:  Denies lumps, pain, nipple discharge, swelling, redness, or any other changes  C/O hole in left breast from past I&D site with bloody drainage  RESPIRATORY:  Denies cough, wheezing, shortness of breath, dyspnea, or orthopnea  CARDIOVASCULAR:  Denies chest pain or palpitations  GASTROINTESTINAL:  Appetite good, denies nausea, vomiting, or indigestion  Bowel movements normal occurring about once daily or every other day  URINARY:  Denies frequency, urgency, incontinence, dysuria, hematuria, nocturia, or recent flank pain  GENITAL:  Denies vaginal discharge, pelvic infection, lesions, ulcers, or pain  Negative dyspareunia or abnormal vaginal bleeding    PERIPHERAL VASCULAR:  Denies varicosities, swelling, skin changes, or pain  MUSCULOSKELETAL:  Denies back, joint, or muscle pain  Negative problems with mobility or movement  PSYCHIATRIC:  Denies problems with depression, anxiety, anger, or other psychiatric symptoms  NEUROLOGIC:  Denies fainting, dizziness, memory problems, seizures, tingling, motor or sensory loss  HEMATOLOGIC:  Denies easy bruising, bleeding, or anemia  ENDOCRINE:  Denies thyroid problems, temperature intolerance, excessive sweating, or other endocrine symptoms  Objective:     Physical Exam   Nursing note and vitals reviewed  GENERAL:  Appears well nourished, well groomed, in no acute distress  SKIN:  Palms warm, dry, color good  Nails without clubbing or cyanosis  No lesions, ulcerations, or wounds  HEAD:  Hair is average texture  Scalp without lesions, normocephalic, and atraumatic  EYES:  Visual fields full by confrontation  Conjunctiva pink, sclera white, PERRLA  Extraocular movements intact  Disc margins sharp, without hemorrhages or exudate  No arteriolar narrowing or A-V nicking  EARS:  B/L ear canals clear  B/L TMs red with serous effusion and decreased light reflex  Acuity good to whispered voice  Vivas midline  AC>BC  NOSE: Mucosa pink, moist, septum midline  + sinus tenderness  B/L turbinates red and edematous with yellow exudate  MOUTH:  Oral mucosa pink  Pharynx pink, moist, without swelling, redness, or exudate  Dentition ok  Tonsils without enlargement or exudate  Tongue midline  NECK:  Supple, trachea midline, Negative thyromegaly  Swelling noted to bilateral cervical glands  LYMPH NODES:  Negative enlargement of neck, axillary, epitrochlear, or inguinal nodes  THORAX/LUNGS  Thorax symmetric with good excursion  Lungs resonant  Breath sounds vesicular with no added sounds  Diaphragm descends within normal limits  CARDIOVASCULAR:  Carotid upstrokes brisk and without bruits     Apical impulse discrete and tapping, barely palpable in the 5th ICS/MCL  Normal S1 and Normal S2, Negative S3 or S4     GR 4/5 holosystolic mitral murmur radiating across chest and up into left carotid, no thrills, lifts, or heaves  ABDOMEN:  Protuberant, bowel sounds normal active x 4 quadrants  Negative tenderness  Negative masses  Negative hepatomegaly  Negative splenomegaly  Negative costovertebral tenderness  EXTREMITIES:  Warm, calves supple, non-tender, negative for edema  Negative stasis pigmentation or ulcers  +2 pulses throughout  MUSCULOSKELETAL:  Negative joint deformities  Good range of motion in hands, wrists, elbows, shoulders, spine, hips, knees, and ankles  Negative spinal curvature  + tenderness of lumbar spine upon palpation with para-lumbar spasms  NEUROLOGICAL:  Mental status:  Awake, alert, and oriented to person, place, time, and event  Normal thought processes  Cranial Nerves:  II-XII intact  Motor:  Good muscle bulk and tone  Strength: 5/5 throughout  Cerebellar:  Rapid alternating movements, point-to-point movements intact  Gait stable and fluid  Sensory:  Pinprick, light touch, position sense, vibration, and stereogenesis intact  Romberg: Negative  Reflexes: +2 throughout  Breast Exam:  Left Lateral Breast 5cm x 5cm Opened Stage 4 Wound draining sanguinous drainage with redness and swelling noted around wound

## 2018-03-20 NOTE — PATIENT INSTRUCTIONS
Acute Wound Care   AMBULATORY CARE:   An acute wound  is an injury that causes a break in the skin  An acute wound can happen suddenly, last a short time, and may heal on its own  Common signs and symptoms of an acute wound:   · A cut, tear, or gash in your skin    · Bleeding, swelling, pain, or trouble moving the affected area    · Dirt or foreign objects inside the wound     · Milky, yellow, green, or brown pus in the wound     · Red, tender, or warm area around the pus    · Fever  Seek care immediately if:   · You have pus or a foul odor coming from the wound  · You have sudden trouble breathing or chest pain  · Blood soaks through your bandage  Contact your healthcare provider if:   · You have muscle, joint, or body aches, sweating, or a fever  · You have more swelling, redness, or bleeding in your wound  · Your skin is itchy, swollen, or you have a rash  · You have questions or concerns about your condition or care  Treatment for an acute wound  may include any of the following:  · Cleansing  is done with soap and water to wash away germs and decrease the risk of infection  Sterile water further cleans the wound  The cleaning is done under high pressure with a catheter tip and large syringe  A solution that kills germs may also be used  · Debridement  is done to clean and remove objects, dirt, or dead tissues from the open wound  Healthcare providers may also drain the wound to clean out pus  · Closure of the wound  is done with stitches, staples, skin adhesive, or other treatments  This may be done if the wound is wide or deep  Stitches may be needed if the wound is in an area that moves a lot, such as the hands, feet, and joints  Stitches may help to keep the wound from getting infected  They may also decrease the amount of scarring you have  Some wounds may heal better without stitches    Wound care:   · If your wound was closed with thin strips of medical tape, keep them clean and dry  The strips of medical tape will fall off on their own  Do not pull them off  · Keep the bandage clean and dry  Do not remove the bandage over your wound unless your healthcare provider says it is okay  · Wash your hands before and after you take care of your wound to prevent infection  · Clean the wound as directed  If you cannot reach the wound, have someone help you  · If you have packing, make sure all the gauze used to pack the wound is taken out and replaced as directed  Keep track of how many gauze dressings are placed inside the wound  Follow up with your healthcare provider as directed:  Write down your questions so you remember to ask them during your visits  © 2016 [employer] is for End User's use only and may not be sold, redistributed or otherwise used for commercial purposes  All illustrations and images included in CareNotes® are the copyrighted property of [employer]  or [employer]ero Jonas  The above information is an  only  It is not intended as medical advice for individual conditions or treatments  Talk to your doctor, nurse or pharmacist before following any medical regimen to see if it is safe and effective for you  Wellness Visit for Adults   WHAT YOU NEED TO KNOW:   What is a wellness visit? A wellness visit is when you see your healthcare provider to get screened for health problems  You can also get advice on how to stay healthy  Write down your questions so you remember to ask them  Ask your healthcare provider how often you should have a wellness visit  What happens at a wellness visit? Your healthcare provider will ask about your health, and your family history of health problems  This includes high blood pressure, heart disease, and cancer  He or she will ask if you have symptoms that concern you, if you smoke, and about your mood   You may also be asked about your intake of medicines, supplements, food, and alcohol  Any of the following may be done:  · Your weight  will be checked  Your height may also be checked so your body mass index (BMI) can be calculated  Your BMI shows if you are at a healthy weight  · Your blood pressure  and heart rate will be checked  Your temperature may also be checked  · Blood and urine tests  may be done  Blood tests may be done to check your cholesterol levels  Abnormal cholesterol levels increase your risk for heart disease and stroke  You may also need a blood or urine test to check for diabetes if you are at increased risk  Urine tests may be done to look for signs of an infection or kidney disease  · A physical exam  includes checking your heartbeat and lungs with a stethoscope  Your healthcare provider may also check your skin to look for sun damage  · Screening tests  may be recommended  A screening test is done to check for diseases that may not cause symptoms  The screening tests you may need depend on your age, gender, family history, and lifestyle habits  For example, colorectal screening may be recommended if you are 48years old or older  What screening tests do I need if I am a woman? · A Pap smear  is used to screen for cervical cancer  Pap smears are usually done every 3 to 5 years depending on your age  You may need them more often if you have had abnormal Pap smear test results in the past  Ask your healthcare provider how often you should have a Pap smear  · A mammogram  is an x-ray of your breasts to screen for breast cancer  Experts recommend mammograms every 2 years starting at age 48 years  You may need a mammogram at age 52 years or younger if you have an increased risk for breast cancer  Talk to your healthcare provider about when you should start having mammograms and how often you need them  What vaccines might I need? · Get an influenza vaccine  every year  The influenza vaccine protects you from the flu   Several types of viruses cause the flu  The viruses change over time, so new vaccines are made each year  · Get a tetanus-diphtheria (Td) booster vaccine  every 10 years  This vaccine protects you against tetanus and diphtheria  Tetanus is a severe infection that may cause painful muscle spasms and lockjaw  Diphtheria is a severe bacterial infection that causes a thick covering in the back of your mouth and throat  · Get a human papillomavirus (HPV) vaccine  if you are female and aged 23 to 32 or male 23 to 24 and never received it  This vaccine protects you from HPV infection  HPV is the most common infection spread by sexual contact  HPV may also cause vaginal, penile, and anal cancers  · Get a pneumococcal vaccine  if you are aged 72 years or older  The pneumococcal vaccine is an injection given to protect you from pneumococcal disease  Pneumococcal disease is an infection caused by pneumococcal bacteria  The infection may cause pneumonia, meningitis, or an ear infection  · Get a shingles vaccine  if you are aged 61 or older, even if you have had shingles before  The shingles vaccine is an injection to protect you from the varicella-zoster virus  This is the same virus that causes chickenpox  Shingles is a painful rash that develops in people who had chickenpox or have been exposed to the virus  How can I eat healthy? My Plate is a model for planning healthy meals  It shows the types and amounts of foods that should go on your plate  Fruits and vegetables make up about half of your plate, and grains and protein make up the other half  A serving of dairy is included on the side of your plate  The amount of calories and serving sizes you need depends on your age, gender, weight, and height  Examples of healthy foods are listed below:  · Eat a variety of vegetables  such as dark green, red, and orange vegetables   You can also include canned vegetables low in sodium (salt) and frozen vegetables without added butter or sauces  · Eat a variety of fresh fruits , canned fruit in 100% juice, frozen fruit, and dried fruit  · Include whole grains  At least half of the grains you eat should be whole grains  Examples include whole-wheat bread, wheat pasta, brown rice, and whole-grain cereals such as oatmeal     · Eat a variety of protein foods such as seafood (fish and shellfish), lean meat, and poultry without skin (turkey and chicken)  Examples of lean meats include pork leg, shoulder, or tenderloin, and beef round, sirloin, tenderloin, and extra lean ground beef  Other protein foods include eggs and egg substitutes, beans, peas, soy products, nuts, and seeds  · Choose low-fat dairy products such as skim or 1% milk or low-fat yogurt, cheese, and cottage cheese  · Limit unhealthy fats  such as butter, hard margarine, and shortening  How much exercise do I need? Exercise at least 30 minutes per day on most days of the week  Some examples of exercise include walking, biking, dancing, and swimming  You can also fit in more physical activity by taking the stairs instead of the elevator or parking farther away from stores  Include muscle strengthening activities 2 days each week  Regular exercise provides many health benefits  It helps you manage your weight, and decreases your risk for type 2 diabetes, heart disease, stroke, and high blood pressure  Exercise can also help improve your mood  Ask your healthcare provider about the best exercise plan for you  What are some general health and safety guidelines I should follow? · Do not smoke  Nicotine and other chemicals in cigarettes and cigars can cause lung damage  Ask your healthcare provider for information if you currently smoke and need help to quit  E-cigarettes or smokeless tobacco still contain nicotine  Talk to your healthcare provider before you use these products  · Limit alcohol    A drink of alcohol is 12 ounces of beer, 5 ounces of wine, or 1½ ounces of liquor  · Lose weight, if needed  Being overweight increases your risk of certain health conditions  These include heart disease, high blood pressure, type 2 diabetes, and certain types of cancer  · Protect your skin  Do not sunbathe or use tanning beds  Use sunscreen with a SPF 15 or higher  Apply sunscreen at least 15 minutes before you go outside  Reapply sunscreen every 2 hours  Wear protective clothing, hats, and sunglasses when you are outside  · Drive safely  Always wear your seatbelt  Make sure everyone in your car wears a seatbelt  A seatbelt can save your life if you are in an accident  Do not use your cell phone when you are driving  This could distract you and cause an accident  Pull over if you need to make a call or send a text message  · Practice safe sex  Use latex condoms if are sexually active and have more than one partner  Your healthcare provider may recommend screening tests for sexually transmitted infections (STIs)  · Wear helmets, lifejackets, and protective gear  Always wear a helmet when you ride a bike or motorcycle, go skiing, or play sports that could cause a head injury  Wear protective equipment when you play sports  Wear a lifejacket when you are on a boat or doing water sports  CARE AGREEMENT:   You have the right to help plan your care  Learn about your health condition and how it may be treated  Discuss treatment options with your caregivers to decide what care you want to receive  You always have the right to refuse treatment  The above information is an  only  It is not intended as medical advice for individual conditions or treatments  Talk to your doctor, nurse or pharmacist before following any medical regimen to see if it is safe and effective for you  © 2017 [employer] Information is for End User's use only and may not be sold, redistributed or otherwise used for commercial purposes   All illustrations and images included in CareNotes® are the copyrighted property of [employer] M [employer]  or Jero Jonas  Instructed patient to cleanse left breast wound 2 x daily with 1/2 strength Hydrogen Peroxide and cover with DSD until seen by Surgeon

## 2018-03-22 ENCOUNTER — TRANSCRIBE ORDERS (OUTPATIENT)
Dept: LAB | Facility: MEDICAL CENTER | Age: 58
End: 2018-03-22

## 2018-04-02 RX ORDER — SIMVASTATIN 20 MG
20 TABLET ORAL
Qty: 30 TABLET | Refills: 5 | Status: SHIPPED | OUTPATIENT
Start: 2018-04-02 | End: 2018-04-23 | Stop reason: SDUPTHER

## 2018-04-23 ENCOUNTER — APPOINTMENT (OUTPATIENT)
Dept: WOUND CARE | Facility: HOSPITAL | Age: 58
End: 2018-04-23
Payer: COMMERCIAL

## 2018-04-23 DIAGNOSIS — E78.2 MIXED HYPERLIPIDEMIA: ICD-10-CM

## 2018-04-23 PROCEDURE — 99213 OFFICE O/P EST LOW 20 MIN: CPT

## 2018-04-23 RX ORDER — SIMVASTATIN 20 MG
20 TABLET ORAL
Qty: 30 TABLET | Refills: 5 | Status: SHIPPED | OUTPATIENT
Start: 2018-04-23 | End: 2018-06-26 | Stop reason: ALTCHOICE

## 2018-05-07 ENCOUNTER — TELEPHONE (OUTPATIENT)
Dept: FAMILY MEDICINE CLINIC | Facility: CLINIC | Age: 58
End: 2018-05-07

## 2018-05-07 DIAGNOSIS — Z91.030 BEE STING ALLERGY: Primary | ICD-10-CM

## 2018-05-07 RX ORDER — EPINEPHRINE 0.3 MG/.3ML
0.3 INJECTION SUBCUTANEOUS ONCE
Qty: 0.3 ML | Refills: 2 | Status: SHIPPED | OUTPATIENT
Start: 2018-05-07 | End: 2018-06-26 | Stop reason: SDUPTHER

## 2018-06-11 ENCOUNTER — TELEPHONE (OUTPATIENT)
Dept: FAMILY MEDICINE CLINIC | Facility: CLINIC | Age: 58
End: 2018-06-11

## 2018-06-11 ENCOUNTER — APPOINTMENT (OUTPATIENT)
Dept: WOUND CARE | Facility: CLINIC | Age: 58
End: 2018-06-11
Payer: COMMERCIAL

## 2018-06-11 PROCEDURE — 99212 OFFICE O/P EST SF 10 MIN: CPT | Performed by: REGISTERED NURSE

## 2018-06-26 ENCOUNTER — OFFICE VISIT (OUTPATIENT)
Dept: FAMILY MEDICINE CLINIC | Facility: CLINIC | Age: 58
End: 2018-06-26
Payer: COMMERCIAL

## 2018-06-26 VITALS
HEIGHT: 62 IN | HEART RATE: 65 BPM | RESPIRATION RATE: 18 BRPM | OXYGEN SATURATION: 98 % | BODY MASS INDEX: 26.13 KG/M2 | SYSTOLIC BLOOD PRESSURE: 116 MMHG | TEMPERATURE: 98.8 F | DIASTOLIC BLOOD PRESSURE: 64 MMHG | WEIGHT: 142 LBS

## 2018-06-26 DIAGNOSIS — E78.49 OTHER HYPERLIPIDEMIA: ICD-10-CM

## 2018-06-26 DIAGNOSIS — R52 PAIN MANAGEMENT: ICD-10-CM

## 2018-06-26 DIAGNOSIS — Z13.0 SCREENING FOR DEFICIENCY ANEMIA: ICD-10-CM

## 2018-06-26 DIAGNOSIS — E53.8 VITAMIN B 12 DEFICIENCY: ICD-10-CM

## 2018-06-26 DIAGNOSIS — H65.03 BILATERAL ACUTE SEROUS OTITIS MEDIA, RECURRENCE NOT SPECIFIED: ICD-10-CM

## 2018-06-26 DIAGNOSIS — Z91.030 BEE STING ALLERGY: ICD-10-CM

## 2018-06-26 DIAGNOSIS — K21.9 GASTROESOPHAGEAL REFLUX DISEASE, ESOPHAGITIS PRESENCE NOT SPECIFIED: ICD-10-CM

## 2018-06-26 DIAGNOSIS — M62.838 MUSCLE SPASM: ICD-10-CM

## 2018-06-26 DIAGNOSIS — Z13.1 SCREENING FOR DIABETES MELLITUS: ICD-10-CM

## 2018-06-26 DIAGNOSIS — G89.29 CHRONIC BILATERAL LOW BACK PAIN WITHOUT SCIATICA: ICD-10-CM

## 2018-06-26 DIAGNOSIS — J30.89 SEASONAL ALLERGIC RHINITIS DUE TO OTHER ALLERGIC TRIGGER: ICD-10-CM

## 2018-06-26 DIAGNOSIS — G47.09 OTHER INSOMNIA: ICD-10-CM

## 2018-06-26 DIAGNOSIS — N64.4 BREAST PAIN, LEFT: ICD-10-CM

## 2018-06-26 DIAGNOSIS — M54.50 CHRONIC BILATERAL LOW BACK PAIN WITHOUT SCIATICA: ICD-10-CM

## 2018-06-26 DIAGNOSIS — M41.35 THORACOGENIC SCOLIOSIS OF THORACOLUMBAR REGION: ICD-10-CM

## 2018-06-26 DIAGNOSIS — J01.00 ACUTE NON-RECURRENT MAXILLARY SINUSITIS: ICD-10-CM

## 2018-06-26 DIAGNOSIS — Z00.00 HEALTHCARE MAINTENANCE: ICD-10-CM

## 2018-06-26 DIAGNOSIS — I10 ESSENTIAL HYPERTENSION: Primary | ICD-10-CM

## 2018-06-26 DIAGNOSIS — E78.2 MIXED HYPERLIPIDEMIA: ICD-10-CM

## 2018-06-26 DIAGNOSIS — Z13.29 SCREENING FOR HYPOTHYROIDISM: ICD-10-CM

## 2018-06-26 DIAGNOSIS — E55.9 VITAMIN D DEFICIENCY: ICD-10-CM

## 2018-06-26 DIAGNOSIS — E53.8 VITAMIN B12 DEFICIENCY: ICD-10-CM

## 2018-06-26 PROCEDURE — 99214 OFFICE O/P EST MOD 30 MIN: CPT | Performed by: NURSE PRACTITIONER

## 2018-06-26 PROCEDURE — 80361 OPIATES 1 OR MORE: CPT | Performed by: NURSE PRACTITIONER

## 2018-06-26 RX ORDER — LANOLIN ALCOHOL/MO/W.PET/CERES
3 CREAM (GRAM) TOPICAL
Qty: 30 TABLET | Refills: 5 | Status: SHIPPED | OUTPATIENT
Start: 2018-06-26

## 2018-06-26 RX ORDER — IBUPROFEN 600 MG/1
600 TABLET ORAL EVERY 6 HOURS PRN
Qty: 120 TABLET | Refills: 5 | Status: SHIPPED | OUTPATIENT
Start: 2018-06-26 | End: 2018-07-21 | Stop reason: HOSPADM

## 2018-06-26 RX ORDER — CHOLECALCIFEROL (VITAMIN D3) 1250 MCG
1 CAPSULE ORAL 2 TIMES WEEKLY
Qty: 8 CAPSULE | Refills: 5 | Status: SHIPPED | OUTPATIENT
Start: 2018-06-28

## 2018-06-26 RX ORDER — TIZANIDINE 4 MG/1
4 TABLET ORAL EVERY 8 HOURS PRN
Qty: 90 TABLET | Refills: 5 | Status: SHIPPED | OUTPATIENT
Start: 2018-06-26 | End: 2018-07-21 | Stop reason: HOSPADM

## 2018-06-26 RX ORDER — EPINEPHRINE 0.3 MG/.3ML
0.3 INJECTION SUBCUTANEOUS ONCE
Qty: 0.3 ML | Refills: 3 | Status: SHIPPED | OUTPATIENT
Start: 2018-06-26 | End: 2018-07-21 | Stop reason: HOSPADM

## 2018-06-26 RX ORDER — CIPROFLOXACIN 500 MG/1
500 TABLET, FILM COATED ORAL EVERY 12 HOURS SCHEDULED
Qty: 20 TABLET | Refills: 2 | Status: SHIPPED | OUTPATIENT
Start: 2018-06-26 | End: 2018-07-06

## 2018-06-26 RX ORDER — LANOLIN ALCOHOL/MO/W.PET/CERES
1000 CREAM (GRAM) TOPICAL DAILY
Qty: 30 TABLET | Refills: 5 | Status: SHIPPED | OUTPATIENT
Start: 2018-06-26 | End: 2018-07-21 | Stop reason: HOSPADM

## 2018-06-26 RX ORDER — LOVASTATIN 20 MG/1
20 TABLET ORAL
Qty: 30 TABLET | Refills: 5 | Status: SHIPPED | OUTPATIENT
Start: 2018-06-26 | End: 2018-07-21 | Stop reason: HOSPADM

## 2018-06-26 RX ORDER — ELECTROLYTES/DEXTROSE
1 SOLUTION, ORAL ORAL DAILY
Qty: 30 TABLET | Refills: 5 | Status: SHIPPED | OUTPATIENT
Start: 2018-06-26

## 2018-06-26 RX ORDER — LORATADINE 10 MG/1
10 TABLET ORAL DAILY
Qty: 30 TABLET | Refills: 5 | Status: SHIPPED | OUTPATIENT
Start: 2018-06-26

## 2018-06-26 RX ORDER — FLUTICASONE PROPIONATE 50 MCG
1 SPRAY, SUSPENSION (ML) NASAL DAILY
Qty: 16 G | Refills: 5 | Status: SHIPPED | OUTPATIENT
Start: 2018-06-26

## 2018-06-26 RX ORDER — RANITIDINE 150 MG/1
150 TABLET ORAL 2 TIMES DAILY
Qty: 60 TABLET | Refills: 5 | Status: SHIPPED | OUTPATIENT
Start: 2018-06-26 | End: 2018-07-21 | Stop reason: HOSPADM

## 2018-06-26 RX ORDER — ACETAMINOPHEN AND CODEINE PHOSPHATE 300; 30 MG/1; MG/1
1 TABLET ORAL EVERY 6 HOURS PRN
Qty: 120 TABLET | Refills: 2 | Status: SHIPPED | OUTPATIENT
Start: 2018-06-26 | End: 2018-07-21 | Stop reason: HOSPADM

## 2018-06-26 RX ORDER — LISINOPRIL 30 MG/1
30 TABLET ORAL DAILY
Qty: 30 TABLET | Refills: 5 | Status: SHIPPED | OUTPATIENT
Start: 2018-06-26 | End: 2018-07-21 | Stop reason: HOSPADM

## 2018-06-26 NOTE — PATIENT INSTRUCTIONS
Acute Cough   WHAT YOU NEED TO KNOW:   What is an acute cough? An acute cough can last up to 3 weeks  Common causes of an acute cough include a cold, allergies, or a lung infection  How is the cause of an acute cough diagnosed? Your healthcare provider will examine you and listen to your lungs  Tell your healthcare provider if you cough up any mucus, or have a fever or shortness of breath  Also tell your provider what makes the cough better or worse  Depending on your symptoms, you may need a chest x-ray  A sample of mucus may be collected and tested for infection  How is an acute cough treated? An acute cough usually goes away on its own  Ask your healthcare provider about medicines you can take to decrease your cough  You may need medicine to stop the cough, decrease swelling in your airways, or help open your airways  Medicine may also be given to help you cough up mucus  If you have an infection caused by bacteria, you may need antibiotics  What can I do to manage my cough? · Do not smoke and stay away from others who smoke  Nicotine and other chemicals in cigarettes and cigars can cause lung damage and make your cough worse  Ask your healthcare provider for information if you currently smoke and need help to quit  E-cigarettes or smokeless tobacco still contain nicotine  Talk to your healthcare provider before you use these products  · Drink extra liquids as directed  Liquids will help thin and loosen mucus so you can cough it up  Liquids will also help prevent dehydration  Examples of good liquids to drink include water, fruit juice, and broth  Do not drink liquids that contain caffeine  Caffeine can increase your risk for dehydration  Ask your healthcare provider how much liquid to drink each day  · Rest as directed  Do not do activities that make your cough worse, such as exercise  · Use a humidifier or vaporizer    Use a cool mist humidifier or a vaporizer to increase air moisture in your home  This may make it easier for you to breathe and help decrease your cough  · Eat 2 to 5 mL of honey 2 times each day  Honey can help thin mucus and decrease your cough  · Use cough drops or lozenges  These can help decrease throat irritation and your cough  When should I seek immediate care? · You have trouble breathing or feel short of breath  · You cough up blood, or you see blood in your mucus  · You faint or feel weak or dizzy  · You have chest pain when you cough or take a deep breath  · You have new wheezing  When should I contact my healthcare provider? · You have a fever  · Your cough lasts longer than 4 weeks  · Your symptoms do not improve with treatment  · You have questions or concerns about your condition or care  CARE AGREEMENT:   You have the right to help plan your care  Learn about your health condition and how it may be treated  Discuss treatment options with your caregivers to decide what care you want to receive  You always have the right to refuse treatment  The above information is an  only  It is not intended as medical advice for individual conditions or treatments  Talk to your doctor, nurse or pharmacist before following any medical regimen to see if it is safe and effective for you  © 2017 [address] [facility] Information is for End User's use only and may not be sold, redistributed or otherwise used for commercial purposes  All illustrations and images included in CareNotes® are the copyrighted property of [employer]  or [employer]  Chronic Wounds   WHAT YOU NEED TO KNOW:   What is a chronic wound? A chronic wound is a wound that does not heal completely in 6 weeks  A wound is an injury that causes a break in the skin  There may also be damage to nearby tissues  Examples of wounds that can become chronic are deep ulcers (open sores), large burns, and infected cuts    What leads to a chronic wound? Conditions that slow or stop the healing process may lead to a chronic wound  These may include any of the following:  · Poor blood supply or low oxygen  can be caused by low blood pressure, or blocked or narrowed blood vessels  This is more likely if you smoke or have heart or blood vessel disease  Blood, heart, kidney, and lung disease can also decrease the oxygen supply to tissues  · An infection  occurs when bacteria get into your wound  Objects in the wound, such as glass or metal, may bring bacteria into your wound  Dead tissue in your wound may give bacteria a place to grow  Diseases such as diabetes can also increase your risk for infection  · A weak immune system  may lead to a chronic wound  Radiation treatments, poor nutrition, and certain medicines, such as steroids, weaken the immune system  Diseases such as cancer and diabetes can weaken your immune system and make it hard to fight an infection  · Swelling in the tissues around your wound  can cause pressure that decreases blood flow to the area  Tissue swelling may happen with traumatic injuries  It can also happen with conditions that cause decreased blood flow to the area, such as heart failure or blood vessel problems  What signs and symptoms may happen with a chronic wound? · Fever    · The area around your wound is red and warm to the touch    · Milky, yellow, green, or brown pus in the wound     · Bleeding, swelling, or pain in the affected area    · Trouble moving the affected area    · Wound has become larger or deeper    · Dark or black skin around the wound  How is a chronic wound diagnosed? Your healthcare provider will ask about your wound  He or she will ask about your health, the medicines you take, and any past surgeries  He or she will examine the wound and the area around it  Your healthcare provider will check to see how deep the wound is and look for signs of infection   You may also need any of the following:  · Blood tests  are done to see if you have an infection  · A wound culture  is a test of fluid or tissue used to find the cause of your infection  · An x-ray  is a picture of your bones and tissues in the wound area  You may need to have an x-ray if the wound is near a joint or bone  Healthcare providers look for broken bones, or foreign objects such as glass or metal   How is a chronic wound treated? Your treatment depends on where your wound is located and how severe it is  If a medical condition such as diabetes is delaying wound healing, it is important to treat the condition  Healthcare providers may change your treatment over time if your wound still does not heal  Your treatment may also change as your wound heals  You may need any of the following:  · Antibiotics  may be given to prevent or treat an infection caused by bacteria  · Wound care:      ¨ Cleansing  is done by flushing the wound with sterile fluid  Healthcare providers may use a large syringe with a needle or catheter (tube) tip  They may also use a liquid that kills germs  ¨ Debridement  is done to remove anything from the wound that can delay healing and lead to infection  This includes dead tissue, and objects such as small rocks and dirt  Your healthcare provider may cut out the damaged areas in or around the wound  He or she may also drain the wound to clean out pus  Moist bandages may be placed inside the wound, or bandages that contain enzymes may be used  Hydrotherapy (whirlpool treatment) uses water to clean wounds  It may be used to clean and debride burn wounds  ¨ Dressings  are used to protect the wound and promote wound healing  Many kinds of dressings can be used for chronic wounds  An elastic bandage may be wrapped around the wound area to put light pressure on it  The light pressure helps to decrease swelling in tissues around the wound area   Dressings may be in the form of bandages, gauze, films, gels, or foams  They may contain substances to help your wound heal faster  ¨ Negative pressure wound therapy (NPWT)  may also be done  This therapy is also called wound vacuum, or wound vac therapy  A vacuum device uses suction to remove fluid and waste from your wound and pull the edges closer together  NPWT may also increase blood flow and new tissue growth in the wound  · Hyperbaric oxygen therapy  is used to get more oxygen into the area of the wound  The oxygen is given under pressure inside of a hyperbaric or pressure chamber  You may need to have this therapy more than once  What do I need to know about wound care? · Wash your hands before and after you take care of your wound  · Keep the bandage clean and dry  Do not stop using the bandage on your wound unless your healthcare provider says it is okay  · Clean the wound and change the dressing as often as directed by your healthcare provider  What can I do to help my wound heal?   · Eat healthy foods and drink liquids as directed  Healthy foods give your body the nutrients it needs to heal your wound  Liquids prevent dehydration that can decrease the blood supply to your wound  Healthy foods include fruits, vegetables, grains (breads and cereals), dairy, and protein foods  Protein foods include meat, fish, nuts, and soy products  Protein, calories, vitamin C, and zinc help wounds heal  Ask your healthcare provider for more information about the foods you should eat to improve healing  · Do not smoke  If you smoke, it is never too late to quit  Smoking delays wound healing  Smoking also increases your risk for infection after surgery  Ask your healthcare provider for information if you need help quitting  How can I prevent wounds caused by pressure? Pressure wounds can develop when blood flow to an area is blocked  For example, you sit or lie in the same position without moving and put pressure on your heels   You can prevent pressure wounds by doing any of the following:  · Change your position every 15 minutes while you are sitting  · Change your position every 2 hours while you lie in bed  · Prop your legs on pillows to lift your heels while you are lying down  · Check your skin or have someone else check your skin daily  Check the areas that are common to pressure wounds, such as elbows, heels, and buttocks  Common early signs of pressure wounds are open sores, blisters, or changes in color or temperature  When should I contact my healthcare provider? · You have a fever  · You have increased or new pain, swelling, redness, or bleeding in or around your wound  · You have pus or a foul odor coming from your wound  · Your skin itches or has a rash  · You have open sores, blisters, or changes in the color or temperature of your skin  · You have questions or concerns about your condition or care  CARE AGREEMENT:   You have the right to help plan your care  Learn about your health condition and how it may be treated  Discuss treatment options with your caregivers to decide what care you want to receive  You always have the right to refuse treatment  The above information is an  only  It is not intended as medical advice for individual conditions or treatments  Talk to your doctor, nurse or pharmacist before following any medical regimen to see if it is safe and effective for you  © 2017 [address] Information is for End User's use only and may not be sold, redistributed or otherwise used for commercial purposes  All illustrations and images included in CareNotes® are the copyrighted property of [employer]  or [address]  Wellness Visit for Adults   WHAT YOU NEED TO KNOW:   What is a wellness visit? A wellness visit is when you see your healthcare provider to get screened for health problems  You can also get advice on how to stay healthy   Write down your questions so you remember to ask them  Ask your healthcare provider how often you should have a wellness visit  What happens at a wellness visit? Your healthcare provider will ask about your health, and your family history of health problems  This includes high blood pressure, heart disease, and cancer  He or she will ask if you have symptoms that concern you, if you smoke, and about your mood  You may also be asked about your intake of medicines, supplements, food, and alcohol  Any of the following may be done:  · Your weight  will be checked  Your height may also be checked so your body mass index (BMI) can be calculated  Your BMI shows if you are at a healthy weight  · Your blood pressure  and heart rate will be checked  Your temperature may also be checked  · Blood and urine tests  may be done  Blood tests may be done to check your cholesterol levels  Abnormal cholesterol levels increase your risk for heart disease and stroke  You may also need a blood or urine test to check for diabetes if you are at increased risk  Urine tests may be done to look for signs of an infection or kidney disease  · A physical exam  includes checking your heartbeat and lungs with a stethoscope  Your healthcare provider may also check your skin to look for sun damage  · Screening tests  may be recommended  A screening test is done to check for diseases that may not cause symptoms  The screening tests you may need depend on your age, gender, family history, and lifestyle habits  For example, colorectal screening may be recommended if you are 48years old or older  What screening tests do I need if I am a woman? · A Pap smear  is used to screen for cervical cancer  Pap smears are usually done every 3 to 5 years depending on your age  You may need them more often if you have had abnormal Pap smear test results in the past  Ask your healthcare provider how often you should have a Pap smear       · A mammogram  is an x-ray of your breasts to screen for breast cancer  Experts recommend mammograms every 2 years starting at age 48 years  You may need a mammogram at age 52 years or younger if you have an increased risk for breast cancer  Talk to your healthcare provider about when you should start having mammograms and how often you need them  What vaccines might I need? · Get an influenza vaccine  every year  The influenza vaccine protects you from the flu  Several types of viruses cause the flu  The viruses change over time, so new vaccines are made each year  · Get a tetanus-diphtheria (Td) booster vaccine  every 10 years  This vaccine protects you against tetanus and diphtheria  Tetanus is a severe infection that may cause painful muscle spasms and lockjaw  Diphtheria is a severe bacterial infection that causes a thick covering in the back of your mouth and throat  · Get a human papillomavirus (HPV) vaccine  if you are female and aged 23 to 32 or male 23 to 24 and never received it  This vaccine protects you from HPV infection  HPV is the most common infection spread by sexual contact  HPV may also cause vaginal, penile, and anal cancers  · Get a pneumococcal vaccine  if you are aged 72 years or older  The pneumococcal vaccine is an injection given to protect you from pneumococcal disease  Pneumococcal disease is an infection caused by pneumococcal bacteria  The infection may cause pneumonia, meningitis, or an ear infection  · Get a shingles vaccine  if you are aged 61 or older, even if you have had shingles before  The shingles vaccine is an injection to protect you from the varicella-zoster virus  This is the same virus that causes chickenpox  Shingles is a painful rash that develops in people who had chickenpox or have been exposed to the virus  How can I eat healthy? My Plate is a model for planning healthy meals  It shows the types and amounts of foods that should go on your plate   Fruits and vegetables make up about half of your plate, and grains and protein make up the other half  A serving of dairy is included on the side of your plate  The amount of calories and serving sizes you need depends on your age, gender, weight, and height  Examples of healthy foods are listed below:  · Eat a variety of vegetables  such as dark green, red, and orange vegetables  You can also include canned vegetables low in sodium (salt) and frozen vegetables without added butter or sauces  · Eat a variety of fresh fruits , canned fruit in 100% juice, frozen fruit, and dried fruit  · Include whole grains  At least half of the grains you eat should be whole grains  Examples include whole-wheat bread, wheat pasta, brown rice, and whole-grain cereals such as oatmeal     · Eat a variety of protein foods such as seafood (fish and shellfish), lean meat, and poultry without skin (turkey and chicken)  Examples of lean meats include pork leg, shoulder, or tenderloin, and beef round, sirloin, tenderloin, and extra lean ground beef  Other protein foods include eggs and egg substitutes, beans, peas, soy products, nuts, and seeds  · Choose low-fat dairy products such as skim or 1% milk or low-fat yogurt, cheese, and cottage cheese  · Limit unhealthy fats  such as butter, hard margarine, and shortening  How much exercise do I need? Exercise at least 30 minutes per day on most days of the week  Some examples of exercise include walking, biking, dancing, and swimming  You can also fit in more physical activity by taking the stairs instead of the elevator or parking farther away from stores  Include muscle strengthening activities 2 days each week  Regular exercise provides many health benefits  It helps you manage your weight, and decreases your risk for type 2 diabetes, heart disease, stroke, and high blood pressure  Exercise can also help improve your mood  Ask your healthcare provider about the best exercise plan for you     What are some general health and safety guidelines I should follow? · Do not smoke  Nicotine and other chemicals in cigarettes and cigars can cause lung damage  Ask your healthcare provider for information if you currently smoke and need help to quit  E-cigarettes or smokeless tobacco still contain nicotine  Talk to your healthcare provider before you use these products  · Limit alcohol  A drink of alcohol is 12 ounces of beer, 5 ounces of wine, or 1½ ounces of liquor  · Lose weight, if needed  Being overweight increases your risk of certain health conditions  These include heart disease, high blood pressure, type 2 diabetes, and certain types of cancer  · Protect your skin  Do not sunbathe or use tanning beds  Use sunscreen with a SPF 15 or higher  Apply sunscreen at least 15 minutes before you go outside  Reapply sunscreen every 2 hours  Wear protective clothing, hats, and sunglasses when you are outside  · Drive safely  Always wear your seatbelt  Make sure everyone in your car wears a seatbelt  A seatbelt can save your life if you are in an accident  Do not use your cell phone when you are driving  This could distract you and cause an accident  Pull over if you need to make a call or send a text message  · Practice safe sex  Use latex condoms if are sexually active and have more than one partner  Your healthcare provider may recommend screening tests for sexually transmitted infections (STIs)  · Wear helmets, lifejackets, and protective gear  Always wear a helmet when you ride a bike or motorcycle, go skiing, or play sports that could cause a head injury  Wear protective equipment when you play sports  Wear a lifejacket when you are on a boat or doing water sports  CARE AGREEMENT:   You have the right to help plan your care  Learn about your health condition and how it may be treated  Discuss treatment options with your caregivers to decide what care you want to receive   You always have the right to refuse treatment  The above information is an  only  It is not intended as medical advice for individual conditions or treatments  Talk to your doctor, nurse or pharmacist before following any medical regimen to see if it is safe and effective for you  © 2017 [address] Information is for End User's use only and may not be sold, redistributed or otherwise used for commercial purposes  All illustrations and images included in CareNotes® are the copyrighted property of [employer]  or Jero Jonas  Opioid Pain Management   AMBULATORY CARE:   An opioid  is a type of medicine used to treat pain  Examples of opioids are oxycodone, morphine, fentanyl, or codeine  Take opioid medicines as directed, for the condition it is prescribed:  Common problems that may occur when you do not take opioid medicines as directed include the following:  · Health problems  may occur  You may have trouble breathing  You may also develop liver or kidney damage, or stomach bleeding  Any of these health problems can become life-threatening  · Opioid dependence  means your body needs the opioid medicine to keep it from going through withdrawal      · Opioid tolerance  means the opioid does not control pain as well as it used to  You need higher doses of the opioid to get pain relief  · Opioid addiction  means you are not able to control the use of the opioid medicine  You use it when you do not have pain  You crave the opioid medicine  Call 911 or have someone call 911 for any of the following:   · You are breathing slower than normal, or you have trouble breathing  · You cannot be awakened  · You have a seizure  Seek care immediately if:   · Your heart is beating slower than usual     · Your heart feels like it is jumping or fluttering  · You are so sleepy that you cannot stay awake  · You have severe muscle pain or weakness       · You see or hear things that are not real   Contact your healthcare provider if:   · You are too dizzy to stand up  · Your pain gets worse or you have new pain  · You cannot do your usual activities because of side effects from the opioid  · You are constipated or have abdominal pain  · You have questions or concerns about your condition or care  Opioid safety measures:   · Take your medicine as directed  Ask if you need more information on how to take your medicine correctly  Follow up with your healthcare provider regularly  You may need to have your dose adjusted  Do not use opioid medicine if you are pregnant or breastfeeding  · Give your healthcare provider a list of all your medicines  Include any over-the-counter medicines, vitamins, and herbs  It can be dangerous to take opioids with certain other medicines, such as antihistamines  · Keep opioid medicine in a safe place  Store your opioid medicine in a locked cabinet to keep it away from children and others  · Do not drink alcohol while you use opioids  Alcohol use with an opioid medicine can make you sleepy and slow your breathing rate  You may stop breathing completely  · Do not drive or operate heavy machinery after you take opioid medicine  Opioid medicine can make you drowsy and make it hard to concentrate  You may injure yourself or others if you drive or operate heavy machinery while taking your medicine  · Drink fluids and eat high-fiber foods  Fluids and fiber will help prevent constipation  Ask your healthcare provider what fluids are right for you and how much you should drink  Also ask for a list of foods that contain fiber  Follow up with your healthcare provider as directed: You may need to have your dose adjusted  You may be referred to a pain specialist  Write down your questions so you remember to ask them during your visits     © 2017 [address] Information is for End User's use only and may not be sold, redistributed or otherwise used for commercial purposes  All illustrations and images included in CareNotes® are the copyrighted property of [employer]  or Jero Jonas  The above information is an  only  It is not intended as medical advice for individual conditions or treatments  Talk to your doctor, nurse or pharmacist before following any medical regimen to see if it is safe and effective for you  PDMP checked and WNL

## 2018-06-26 NOTE — PROGRESS NOTES
Assessment/Plan:      Diagnoses and all orders for this visit:    Essential hypertension  -     lisinopril (ZESTRIL) 30 mg tablet; Take 1 tablet (30 mg total) by mouth daily    Screening for deficiency anemia  -     CBC and differential; Future    Vitamin B12 deficiency  -     Vitamin B12; Future    Vitamin D deficiency  -     Vitamin D 25 hydroxy; Future  -     Calcium Carbonate-Vitamin D3 (CALCIUM 600/VITAMIN D) 600-400 MG-UNIT TABS; Take 1 tablet by mouth 2 (two) times a day  -     Cholecalciferol (VITAMIN D3) 08712 units CAPS; Take 1 capsule (50,000 Units total) by mouth 2 (two) times a week    Screening for diabetes mellitus  -     Comprehensive metabolic panel; Future  -     Hemoglobin A1C; Future  -     Insulin, fasting; Future    Other hyperlipidemia  -     Lipid panel; Future    Screening for hypothyroidism  -     TSH baseline; Future    Gastroesophageal reflux disease, esophagitis presence not specified  -     ranitidine (ZANTAC) 150 mg tablet; Take 1 tablet (150 mg total) by mouth 2 (two) times a day    Other insomnia  -     melatonin 3 mg; Take 1 tablet (3 mg total) by mouth daily at bedtime    Vitamin B 12 deficiency  -     cyanocobalamin (VITAMIN B-12) 1,000 mcg tablet; Take 1 tablet (1,000 mcg total) by mouth daily    Healthcare maintenance  -     Multiple Vitamins-Minerals (MULTIVITAMIN ADULT) TABS; Take 1 tablet by mouth daily    Mixed hyperlipidemia  -     lovastatin (MEVACOR) 20 mg tablet; Take 1 tablet (20 mg total) by mouth daily at bedtime    Bee sting allergy  -     EPINEPHrine (EPIPEN) 0 3 mg/0 3 mL SOAJ; Inject 0 3 mL (0 3 mg total) into the shoulder, thigh, or buttocks once for 1 dose    Muscle spasm  -     tiZANidine (ZANAFLEX) 4 mg tablet; Take 1 tablet (4 mg total) by mouth every 8 (eight) hours as needed for muscle spasms    Breast pain, left  -     acetaminophen-codeine (TYLENOL/CODEINE #3) 300-30 MG per tablet;  Take 1 tablet by mouth every 6 (six) hours as needed for moderate pain  - ibuprofen (MOTRIN) 600 mg tablet; Take 1 tablet (600 mg total) by mouth every 6 (six) hours as needed for mild pain for up to 10 days    Seasonal allergic rhinitis due to other allergic trigger  -     fluticasone (FLONASE) 50 mcg/act nasal spray; 1 spray into each nostril daily  -     loratadine (CLARITIN) 10 mg tablet; Take 1 tablet (10 mg total) by mouth daily    Thoracogenic scoliosis of thoracolumbar region  -     ibuprofen (MOTRIN) 600 mg tablet; Take 1 tablet (600 mg total) by mouth every 6 (six) hours as needed for mild pain for up to 10 days    Chronic bilateral low back pain without sciatica  -     ibuprofen (MOTRIN) 600 mg tablet; Take 1 tablet (600 mg total) by mouth every 6 (six) hours as needed for mild pain for up to 10 days    Bilateral acute serous otitis media, recurrence not specified  -     ciprofloxacin (CIPRO) 500 mg tablet; Take 1 tablet (500 mg total) by mouth every 12 (twelve) hours for 10 days    Acute non-recurrent maxillary sinusitis  -     ciprofloxacin (CIPRO) 500 mg tablet; Take 1 tablet (500 mg total) by mouth every 12 (twelve) hours for 10 days    Pain management          Subjective:     Patient ID: Nicolas Height is a 56 y o  female  Patient presents to office for follow up and recheck  Complete medical history and medications reviewed with patient and tolerating all medications well without any problems  Patient had appointment with [facility]'s Wound Management for evaluation and treatment of left lateral breast opened wound but then Wound Specialist told patient she needs to follow up with surgeon and patient agreed so she was referred to surgeon of patient's choice at [facility]  Patient had appointment with [facility] Surgeon for her opened wound of left lateral breast who ordered US and Mammogram of Left Breast and has follow up appointment scheduled with Lilly Hwang this Friday    Patient continues to have serousanguinous from left lateral breast wound and was told by Surgeon there is no infection  Patient is taking Tramadol for her low back pain and left lateral breast wound but states it is not helping her pain  Patient feels her Baclofen is not helping her Muscle Spasms  Patient also C/O increased left lateral breat pain occurring with coughing  C/O productive cough for yellow mucous ongoing for the past 2 weeks  C/O sore throat, sinus congestion for yellow mucous, and B/L ear ache  Patient did not have her labwork done and needs new scripts  Review of Systems    GENERAL:  Feels well, denies any significant changes in weight without trying  SKIN:  Denies rashes, lesions, opened areas, wounds, change in moles or any other skin changes  Opened area of left lateral breast remains and draining serousanguinous drainage  HEENT:  Denies any head injury or headaches  Negative blurred vision, floaters, spots before eyes, infections, or other vision problems  Negative significant changes in vision or hearing  Negative tinnitus, vertigo, or infections  Hx of hay fever symptoms with season changes, C/O sinus congestion for yellow mucous, B/L ear ache, and sore throat  No bloody noses or problems with smell  Negative bleeding gums, ulcers, or sores  Glasses/Contacts: Glasses  Hearing Aids: NO  Dentures/Partials/Implants: NO  NECK:  Denies lumps, goiter, pain, swollen glands, or lymphadenopathy  BREASTS:  Denies lumps, pain, nipple discharge, swelling, redness, or any other changes  Continues with left lateral breast wound  RESPIRATORY:  C/O productive cough for yellow mucous, no wheezing, shortness of breath, dyspnea, or orthopnea  CARDIOVASCULAR:  Denies chest pain or palpitations  GASTROINTESTINAL:  Appetite good, denies nausea, vomiting, or indigestion  Bowel movements normal occurring about once daily or every other day  URINARY:  Denies frequency, urgency, incontinence, dysuria, hematuria, nocturia, or recent flank pain     GENITAL:  Denies vaginal discharge, pelvic infection, lesions, ulcers, or pain  Negative dyspareunia or abnormal vaginal bleeding  PERIPHERAL VASCULAR:  Denies varicosities, swelling, skin changes, or pain  MUSCULOSKELETAL:  Denies back, joint, or muscle pain  Negative problems with mobility or movement  PSYCHIATRIC:  Denies problems with depression, anxiety, anger, or other psychiatric symptoms  NEUROLOGIC:  Denies fainting, dizziness, memory problems, seizures, tingling, motor or sensory loss  HEMATOLOGIC:  Denies easy bruising, bleeding, or anemia  ENDOCRINE:  Denies thyroid problems, temperature intolerance, excessive sweating, or other endocrine symptoms  Objective:     Physical Exam   Nursing note and vitals reviewed  GENERAL:  Appears well nourished, well groomed, in no acute distress  SKIN:  Palms warm, dry, color good  Nails without clubbing or cyanosis  No lesions, ulcerations, or wounds  HEAD:  Hair is average texture  Scalp without lesions, normocephalic, and atraumatic  EYES:  Visual fields full by confrontation  Conjunctiva pink, sclera white, PERRLA  Extraocular movements intact  Disc margins sharp, without hemorrhages or exudate  No arteriolar narrowing or A-V nicking  EARS:  B/L ear canals clear  B/L TMs red with serous effusion and decreased light reflex  Acuity good to whispered voice  Vivas midline  AC>BC  NOSE: Mucosa pink, moist, septum midline  + sinus tenderness  B/L turbinates red and edematous with yellow exudate  MOUTH:  Oral mucosa pink  Pharynx red with yellow PND, moist, without swelling  Dentition ok  Tongue midline  NECK:  Supple, trachea midline, Negative thyromegaly  Swelling noted to bilateral cervical glands  LYMPH NODES:  Negative enlargement of neck, axillary, epitrochlear, or inguinal nodes  THORAX/LUNGS  Thorax symmetric with good excursion  Lungs resonant  Breath sounds vesicular with no added sounds  Diaphragm descends within normal limits  CARDIOVASCULAR:  Carotid upstrokes brisk and without bruits  Apical impulse discrete and tapping, barely palpable in the 5th ICS/MCL  Normal S1 and Normal S2, Negative S3 or S4     GR 4/5 holosystolic mitral murmur radiating across chest and up into left carotid, no thrills, lifts, or heaves  ABDOMEN:  Protuberant, bowel sounds normal active x 4 quadrants  Negative tenderness  Negative masses  Negative hepatomegaly  Negative splenomegaly  Negative costovertebral tenderness  EXTREMITIES:  Warm, calves supple, non-tender, negative for edema  Negative stasis pigmentation or ulcers  +2 pulses throughout  MUSCULOSKELETAL:  Negative joint deformities  Good range of motion in hands, wrists, elbows, shoulders, spine, hips, knees, and ankles  + spasms of left cervical muscle  <20 degree right thoracic spinal curvature with left shoulder lower in height then right shoulder  + tenderness of lumbar spine upon palpation with para-lumbar spasms  NEUROLOGICAL:  Mental status:  Awake, alert, and oriented to person, place, time, and event  Normal thought processes  Cranial Nerves:  II-XII intact  Motor:  Good muscle bulk and tone  Strength: 5/5 throughout  Cerebellar:  Rapid alternating movements, point-to-point movements intact  Gait stable and fluid  Sensory:  Pinprick, light touch, position sense, vibration, and stereogenesis intact  Romberg: Negative  Reflexes: +2 throughout     Breast Exam:  Left Lateral Breast 5cm x 5cm Opened Stage 4 Wound draining sanguinous drainage with no redness and no swelling noted around wound

## 2018-07-05 LAB — OPIATES UR QL SCN: NEGATIVE NG/ML

## 2018-07-18 ENCOUNTER — HOSPITAL ENCOUNTER (INPATIENT)
Facility: HOSPITAL | Age: 58
LOS: 3 days | Discharge: ANOTHER HOSPITAL | DRG: 720 | End: 2018-07-21
Attending: EMERGENCY MEDICINE | Admitting: INTERNAL MEDICINE
Payer: COMMERCIAL

## 2018-07-18 ENCOUNTER — APPOINTMENT (EMERGENCY)
Dept: CT IMAGING | Facility: HOSPITAL | Age: 58
DRG: 720 | End: 2018-07-18
Payer: COMMERCIAL

## 2018-07-18 DIAGNOSIS — D64.9 ANEMIA: Primary | ICD-10-CM

## 2018-07-18 DIAGNOSIS — E55.9 VITAMIN D DEFICIENCY: ICD-10-CM

## 2018-07-18 DIAGNOSIS — C50.919 METASTATIC BREAST CANCER (HCC): ICD-10-CM

## 2018-07-18 DIAGNOSIS — N63.20 LARGE MASS OF LEFT BREAST: ICD-10-CM

## 2018-07-18 DIAGNOSIS — S21.002S BREAST WOUND, LEFT, SEQUELA: ICD-10-CM

## 2018-07-18 DIAGNOSIS — J90 PLEURAL EFFUSION ON LEFT: ICD-10-CM

## 2018-07-18 DIAGNOSIS — S21.002A WOUND OF LEFT BREAST, INITIAL ENCOUNTER: ICD-10-CM

## 2018-07-18 DIAGNOSIS — A41.9 SEPSIS, DUE TO UNSPECIFIED ORGANISM: ICD-10-CM

## 2018-07-18 DIAGNOSIS — E53.8 VITAMIN B 12 DEFICIENCY: ICD-10-CM

## 2018-07-18 DIAGNOSIS — I21.A1 TYPE 2 MYOCARDIAL INFARCTION WITHOUT ST ELEVATION (HCC): ICD-10-CM

## 2018-07-18 PROBLEM — R79.89 ELEVATED PLATELET COUNT: Status: ACTIVE | Noted: 2018-07-18

## 2018-07-18 PROBLEM — D62 ACUTE BLOOD LOSS ANEMIA: Status: ACTIVE | Noted: 2018-07-18

## 2018-07-18 PROBLEM — R73.9 HYPERGLYCEMIA: Status: ACTIVE | Noted: 2018-07-18

## 2018-07-18 PROBLEM — E78.00 HYPERCHOLESTEROLEMIA: Status: ACTIVE | Noted: 2018-07-18

## 2018-07-18 PROBLEM — I82.622 ACUTE DEEP VEIN THROMBOSIS (DVT) OF LEFT UPPER EXTREMITY (HCC): Status: ACTIVE | Noted: 2018-07-18

## 2018-07-18 LAB
ABO GROUP BLD: NORMAL
ALBUMIN SERPL BCP-MCNC: 2.7 G/DL (ref 3.5–5)
ALP SERPL-CCNC: 71 U/L (ref 46–116)
ALT SERPL W P-5'-P-CCNC: 16 U/L (ref 12–78)
ANION GAP SERPL CALCULATED.3IONS-SCNC: 9 MMOL/L (ref 4–13)
ANISOCYTOSIS BLD QL SMEAR: PRESENT
APTT PPP: 50 SECONDS (ref 24–36)
AST SERPL W P-5'-P-CCNC: 15 U/L (ref 5–45)
BASOPHILS # BLD MANUAL: 0 THOUSAND/UL (ref 0–0.1)
BASOPHILS NFR MAR MANUAL: 0 % (ref 0–1)
BILIRUB SERPL-MCNC: 0.4 MG/DL (ref 0.2–1)
BLD GP AB SCN SERPL QL: NEGATIVE
BUN SERPL-MCNC: 13 MG/DL (ref 5–25)
CALCIUM SERPL-MCNC: 9.6 MG/DL (ref 8.3–10.1)
CHLORIDE SERPL-SCNC: 99 MMOL/L (ref 100–108)
CO2 SERPL-SCNC: 25 MMOL/L (ref 21–32)
CREAT SERPL-MCNC: 0.88 MG/DL (ref 0.6–1.3)
EOSINOPHIL # BLD MANUAL: 0 THOUSAND/UL (ref 0–0.4)
EOSINOPHIL NFR BLD MANUAL: 0 % (ref 0–6)
ERYTHROCYTE [DISTWIDTH] IN BLOOD BY AUTOMATED COUNT: 15.2 % (ref 11.6–15.1)
GFR SERPL CREATININE-BSD FRML MDRD: 74 ML/MIN/1.73SQ M
GLUCOSE SERPL-MCNC: 147 MG/DL (ref 65–140)
HCT VFR BLD AUTO: 20.5 % (ref 34.8–46.1)
HCT VFR BLD AUTO: 28.8 % (ref 34.8–46.1)
HGB BLD-MCNC: 6.1 G/DL (ref 11.5–15.4)
HGB BLD-MCNC: 9.4 G/DL (ref 11.5–15.4)
HOLD SPECIMEN: NORMAL
HYPERCHROMIA BLD QL SMEAR: PRESENT
INR PPP: 1.34 (ref 0.86–1.17)
LACTATE SERPL-SCNC: 1.5 MMOL/L (ref 0.5–2)
LYMPHOCYTES # BLD AUTO: 1.45 THOUSAND/UL (ref 0.6–4.47)
LYMPHOCYTES # BLD AUTO: 6 % (ref 14–44)
MCH RBC QN AUTO: 21.7 PG (ref 26.8–34.3)
MCHC RBC AUTO-ENTMCNC: 29.8 G/DL (ref 31.4–37.4)
MCV RBC AUTO: 73 FL (ref 82–98)
MONOCYTES # BLD AUTO: 0.72 THOUSAND/UL (ref 0–1.22)
MONOCYTES NFR BLD: 3 % (ref 4–12)
NEUTROPHILS # BLD MANUAL: 21.94 THOUSAND/UL (ref 1.85–7.62)
NEUTS SEG NFR BLD AUTO: 91 % (ref 43–75)
PLATELET # BLD AUTO: 766 THOUSANDS/UL (ref 149–390)
PLATELET BLD QL SMEAR: ABNORMAL
PMV BLD AUTO: 9.2 FL (ref 8.9–12.7)
POTASSIUM SERPL-SCNC: 3.7 MMOL/L (ref 3.5–5.3)
PROT SERPL-MCNC: 6.7 G/DL (ref 6.4–8.2)
PROTHROMBIN TIME: 15.9 SECONDS (ref 11.8–14.2)
RBC # BLD AUTO: 2.81 MILLION/UL (ref 3.81–5.12)
RH BLD: POSITIVE
SODIUM SERPL-SCNC: 133 MMOL/L (ref 136–145)
SPECIMEN EXPIRATION DATE: NORMAL
TOTAL CELLS COUNTED SPEC: 100
TROPONIN I SERPL-MCNC: 0.3 NG/ML
WBC # BLD AUTO: 24.11 THOUSAND/UL (ref 4.31–10.16)

## 2018-07-18 PROCEDURE — 83605 ASSAY OF LACTIC ACID: CPT | Performed by: INTERNAL MEDICINE

## 2018-07-18 PROCEDURE — 86920 COMPATIBILITY TEST SPIN: CPT

## 2018-07-18 PROCEDURE — 96375 TX/PRO/DX INJ NEW DRUG ADDON: CPT

## 2018-07-18 PROCEDURE — 36415 COLL VENOUS BLD VENIPUNCTURE: CPT | Performed by: EMERGENCY MEDICINE

## 2018-07-18 PROCEDURE — 85610 PROTHROMBIN TIME: CPT | Performed by: EMERGENCY MEDICINE

## 2018-07-18 PROCEDURE — 86900 BLOOD TYPING SEROLOGIC ABO: CPT | Performed by: EMERGENCY MEDICINE

## 2018-07-18 PROCEDURE — 87040 BLOOD CULTURE FOR BACTERIA: CPT | Performed by: INTERNAL MEDICINE

## 2018-07-18 PROCEDURE — 84145 PROCALCITONIN (PCT): CPT | Performed by: INTERNAL MEDICINE

## 2018-07-18 PROCEDURE — 85027 COMPLETE CBC AUTOMATED: CPT | Performed by: EMERGENCY MEDICINE

## 2018-07-18 PROCEDURE — 80053 COMPREHEN METABOLIC PANEL: CPT | Performed by: EMERGENCY MEDICINE

## 2018-07-18 PROCEDURE — 99285 EMERGENCY DEPT VISIT HI MDM: CPT

## 2018-07-18 PROCEDURE — 85018 HEMOGLOBIN: CPT | Performed by: INTERNAL MEDICINE

## 2018-07-18 PROCEDURE — 74177 CT ABD & PELVIS W/CONTRAST: CPT

## 2018-07-18 PROCEDURE — P9021 RED BLOOD CELLS UNIT: HCPCS

## 2018-07-18 PROCEDURE — 85014 HEMATOCRIT: CPT | Performed by: INTERNAL MEDICINE

## 2018-07-18 PROCEDURE — 71275 CT ANGIOGRAPHY CHEST: CPT

## 2018-07-18 PROCEDURE — 84484 ASSAY OF TROPONIN QUANT: CPT | Performed by: INTERNAL MEDICINE

## 2018-07-18 PROCEDURE — 85730 THROMBOPLASTIN TIME PARTIAL: CPT | Performed by: EMERGENCY MEDICINE

## 2018-07-18 PROCEDURE — 99223 1ST HOSP IP/OBS HIGH 75: CPT | Performed by: INTERNAL MEDICINE

## 2018-07-18 PROCEDURE — 85007 BL SMEAR W/DIFF WBC COUNT: CPT | Performed by: EMERGENCY MEDICINE

## 2018-07-18 PROCEDURE — 86901 BLOOD TYPING SEROLOGIC RH(D): CPT | Performed by: EMERGENCY MEDICINE

## 2018-07-18 PROCEDURE — 96374 THER/PROPH/DIAG INJ IV PUSH: CPT

## 2018-07-18 PROCEDURE — 96361 HYDRATE IV INFUSION ADD-ON: CPT

## 2018-07-18 PROCEDURE — 87081 CULTURE SCREEN ONLY: CPT | Performed by: INTERNAL MEDICINE

## 2018-07-18 PROCEDURE — 86850 RBC ANTIBODY SCREEN: CPT | Performed by: EMERGENCY MEDICINE

## 2018-07-18 RX ORDER — ACETAMINOPHEN 325 MG/1
650 TABLET ORAL EVERY 6 HOURS PRN
Status: DISCONTINUED | OUTPATIENT
Start: 2018-07-18 | End: 2018-07-21 | Stop reason: HOSPADM

## 2018-07-18 RX ORDER — PRAVASTATIN SODIUM 20 MG
20 TABLET ORAL
Status: DISCONTINUED | OUTPATIENT
Start: 2018-07-19 | End: 2018-07-18

## 2018-07-18 RX ORDER — VANCOMYCIN HYDROCHLORIDE 1 G/200ML
15 INJECTION, SOLUTION INTRAVENOUS EVERY 12 HOURS
Status: DISCONTINUED | OUTPATIENT
Start: 2018-07-18 | End: 2018-07-19

## 2018-07-18 RX ORDER — B-COMPLEX WITH VITAMIN C
1 TABLET ORAL 2 TIMES DAILY WITH MEALS
Status: DISCONTINUED | OUTPATIENT
Start: 2018-07-18 | End: 2018-07-21 | Stop reason: HOSPADM

## 2018-07-18 RX ORDER — FENTANYL CITRATE 50 UG/ML
50 INJECTION, SOLUTION INTRAMUSCULAR; INTRAVENOUS ONCE
Status: COMPLETED | OUTPATIENT
Start: 2018-07-18 | End: 2018-07-18

## 2018-07-18 RX ORDER — LORATADINE 10 MG/1
10 TABLET ORAL DAILY
Status: DISCONTINUED | OUTPATIENT
Start: 2018-07-19 | End: 2018-07-21 | Stop reason: HOSPADM

## 2018-07-18 RX ORDER — MORPHINE SULFATE 2 MG/ML
2 INJECTION, SOLUTION INTRAMUSCULAR; INTRAVENOUS
Status: DISCONTINUED | OUTPATIENT
Start: 2018-07-18 | End: 2018-07-21 | Stop reason: HOSPADM

## 2018-07-18 RX ORDER — ONDANSETRON 2 MG/ML
4 INJECTION INTRAMUSCULAR; INTRAVENOUS EVERY 6 HOURS PRN
Status: DISCONTINUED | OUTPATIENT
Start: 2018-07-18 | End: 2018-07-21 | Stop reason: HOSPADM

## 2018-07-18 RX ORDER — ATORVASTATIN CALCIUM 40 MG/1
40 TABLET, FILM COATED ORAL
Status: DISCONTINUED | OUTPATIENT
Start: 2018-07-19 | End: 2018-07-21 | Stop reason: HOSPADM

## 2018-07-18 RX ORDER — LORAZEPAM 2 MG/ML
0.5 INJECTION INTRAMUSCULAR ONCE
Status: COMPLETED | OUTPATIENT
Start: 2018-07-18 | End: 2018-07-18

## 2018-07-18 RX ORDER — FAMOTIDINE 20 MG/1
20 TABLET, FILM COATED ORAL 2 TIMES DAILY
Status: DISCONTINUED | OUTPATIENT
Start: 2018-07-18 | End: 2018-07-21 | Stop reason: HOSPADM

## 2018-07-18 RX ORDER — CHOLECALCIFEROL (VITAMIN D3) 125 MCG
500 CAPSULE ORAL DAILY
Status: DISCONTINUED | OUTPATIENT
Start: 2018-07-19 | End: 2018-07-21 | Stop reason: HOSPADM

## 2018-07-18 RX ORDER — LANOLIN ALCOHOL/MO/W.PET/CERES
3 CREAM (GRAM) TOPICAL
Status: DISCONTINUED | OUTPATIENT
Start: 2018-07-18 | End: 2018-07-21 | Stop reason: HOSPADM

## 2018-07-18 RX ORDER — FLUTICASONE PROPIONATE 50 MCG
1 SPRAY, SUSPENSION (ML) NASAL DAILY
Status: DISCONTINUED | OUTPATIENT
Start: 2018-07-19 | End: 2018-07-21 | Stop reason: HOSPADM

## 2018-07-18 RX ORDER — SODIUM CHLORIDE 9 MG/ML
100 INJECTION, SOLUTION INTRAVENOUS CONTINUOUS
Status: DISCONTINUED | OUTPATIENT
Start: 2018-07-18 | End: 2018-07-19

## 2018-07-18 RX ORDER — OXYCODONE HYDROCHLORIDE 5 MG/1
5 TABLET ORAL EVERY 4 HOURS PRN
Status: DISCONTINUED | OUTPATIENT
Start: 2018-07-18 | End: 2018-07-21 | Stop reason: HOSPADM

## 2018-07-18 RX ORDER — ASPIRIN 81 MG/1
81 TABLET, CHEWABLE ORAL DAILY
Status: DISCONTINUED | OUTPATIENT
Start: 2018-07-19 | End: 2018-07-21

## 2018-07-18 RX ADMIN — FAMOTIDINE 20 MG: 20 TABLET ORAL at 22:12

## 2018-07-18 RX ADMIN — IOHEXOL 100 ML: 350 INJECTION, SOLUTION INTRAVENOUS at 17:44

## 2018-07-18 RX ADMIN — VANCOMYCIN HYDROCHLORIDE 1000 MG: 1 INJECTION, SOLUTION INTRAVENOUS at 23:48

## 2018-07-18 RX ADMIN — LORAZEPAM 0.5 MG: 2 INJECTION INTRAMUSCULAR; INTRAVENOUS at 17:16

## 2018-07-18 RX ADMIN — MELATONIN TAB 3 MG 3 MG: 3 TAB at 22:12

## 2018-07-18 RX ADMIN — SODIUM CHLORIDE 1000 ML: 0.9 INJECTION, SOLUTION INTRAVENOUS at 14:37

## 2018-07-18 RX ADMIN — SODIUM CHLORIDE 100 ML/HR: 0.9 INJECTION, SOLUTION INTRAVENOUS at 21:58

## 2018-07-18 RX ADMIN — CALCIUM CARBONATE-VITAMIN D TAB 500 MG-200 UNIT 1 TABLET: 500-200 TAB at 22:12

## 2018-07-18 RX ADMIN — FENTANYL CITRATE 50 MCG: 50 INJECTION, SOLUTION INTRAMUSCULAR; INTRAVENOUS at 14:43

## 2018-07-18 RX ADMIN — CEFEPIME HYDROCHLORIDE 2000 MG: 2 INJECTION, SOLUTION INTRAVENOUS at 22:33

## 2018-07-18 RX ADMIN — MORPHINE SULFATE 2 MG: 2 INJECTION, SOLUTION INTRAMUSCULAR; INTRAVENOUS at 21:54

## 2018-07-18 NOTE — ED NOTES
Dr Paz Prudent at bedside to discussed possible tranfers        Donato Bowling, NAVJOT  07/18/18 2680 Scribe Attestation (For Scribes USE Only)... Scribe Attestation (For Scribes USE Only).../Attending Attestation (For Attendings USE Only)...

## 2018-07-18 NOTE — ED NOTES
Necrotic large area noted on left breast with hole in same  Bleeding noted   Direct pressure applied     Nico Heath RN  07/18/18 8779

## 2018-07-18 NOTE — ED PROVIDER NOTES
History  Chief Complaint   Patient presents with    Arm Swelling     pt was seen at [facility] ER on monday  was told she has a blood clot in her left arm, suspicious of PE  was started on lovenox  [place] out because [place] far  called PCP they told her she can come here  pt recently told she has CA  had mamogram where "they drained something"  left breast actively oozing blood with large hole  pt c/o dizziness at this time  Patient presents with bleeding from a fungating left breast mass associated with starting Lovenox 2 days ago for left upper extremity DVT identified on ultrasound at [facility] 2 days ago  The swelling started 5 days ago  Since December 2017 she has had a fungating mass on her left breast which was recently biopsied as cancerous  She acknowledges she was putting off treatment for this due to transportation problems  She was seen at [facility] several times, where she also had the biopsy, but prefers to continue her care here as it is closer to her home  In the ED 2 days ago, she was advised to get a CTA of her chest to look for PE as well as a PET scan to look for metastases  She did not get either of these tests performed and therefore does not know if she has a PE or additional lesions  She currently complains of pain and persistent venous oozing from the breast mass  She does not have dyspnea or chest pain  She denies a h/o anemia  Her vital sign abnormalities may be due to the pain  Denies f/c, HA, CP, SOB, abdominal pain, n/v/d  12 system ROS o/w negative                History provided by:  Patient and medical records  Medical Problem - Major   Location:  Bleeding from left breast mass  Quality:  Venous oozing  Severity:  Moderate  Onset quality:  Sudden  Duration:  1 hour  Timing:  Constant  Progression:  Unchanged  Chronicity:  New  Context:  Started Lovenox 2 days ago  Relieved by:  Nothing  Worsened by:  Palpation  Associated symptoms: no abdominal pain, no chest pain, no congestion, no cough, no diarrhea, no fever, no headaches, no loss of consciousness, no myalgias, no nausea, no rash, no rhinorrhea, no shortness of breath, no sore throat, no vomiting and no wheezing        Prior to Admission Medications   Prescriptions Last Dose Informant Patient Reported? Taking?    Calcium Carbonate-Vitamin D3 (CALCIUM 600/VITAMIN D) 600-400 MG-UNIT TABS   No Yes   Sig: Take 1 tablet by mouth 2 (two) times a day   Cholecalciferol (VITAMIN D3) 44831 units CAPS   No Yes   Sig: Take 1 capsule (50,000 Units total) by mouth 2 (two) times a week   EPINEPHrine (EPIPEN) 0 3 mg/0 3 mL SOAJ   No Yes   Sig: Inject 0 3 mL (0 3 mg total) into the shoulder, thigh, or buttocks once for 1 dose   Multiple Vitamins-Minerals (MULTIVITAMIN ADULT) TABS   No Yes   Sig: Take 1 tablet by mouth daily   acetaminophen-codeine (TYLENOL/CODEINE #3) 300-30 MG per tablet   No Yes   Sig: Take 1 tablet by mouth every 6 (six) hours as needed for moderate pain   cyanocobalamin (VITAMIN B-12) 1,000 mcg tablet   No Yes   Sig: Take 1 tablet (1,000 mcg total) by mouth daily   fluticasone (FLONASE) 50 mcg/act nasal spray   No Yes   Si spray into each nostril daily   ibuprofen (MOTRIN) 600 mg tablet   No Yes   Sig: Take 1 tablet (600 mg total) by mouth every 6 (six) hours as needed for mild pain for up to 10 days   lisinopril (ZESTRIL) 30 mg tablet   No Yes   Sig: Take 1 tablet (30 mg total) by mouth daily   loratadine (CLARITIN) 10 mg tablet   No Yes   Sig: Take 1 tablet (10 mg total) by mouth daily   lovastatin (MEVACOR) 20 mg tablet   No Yes   Sig: Take 1 tablet (20 mg total) by mouth daily at bedtime   melatonin 3 mg   No Yes   Sig: Take 1 tablet (3 mg total) by mouth daily at bedtime   ranitidine (ZANTAC) 150 mg tablet   No Yes   Sig: Take 1 tablet (150 mg total) by mouth 2 (two) times a day   tiZANidine (ZANAFLEX) 4 mg tablet   No Yes   Sig: Take 1 tablet (4 mg total) by mouth every 8 (eight) hours as needed for muscle spasms      Facility-Administered Medications: None       Past Medical History:   Diagnosis Date    Arm fracture, left     casted    Blood clot in vein     left arm    Breast cancer, stage 3 (HCC)     Bronchitis     Chronic tonsillitis     History of abnormal mammogram 11/2017    Followed by [facility] and Dr Rox Acevedo History of Papanicolaou smear of cervix 06/2017    Followed by Ale Martinez    Hyperlipidemia     Hypertension     Left sided sciatica     Meningitis     Mitral regurgitation     Muscle strain of left lower leg        Past Surgical History:   Procedure Laterality Date    APPENDECTOMY      BREAST SURGERY Left 12/2017    cyst removed    TONSILLECTOMY         Family History   Problem Relation Age of Onset    Breast cancer Mother     Diabetes Maternal Grandfather     Breast cancer Family     Uterine cancer Family     Hypertension Family     Hypertension Father     Hyperlipidemia Father     Diabetes Maternal Grandmother      I have reviewed and agree with the history as documented  Social History   Substance Use Topics    Smoking status: Former Smoker     Packs/day: 0 25     Quit date: 6/27/2018    Smokeless tobacco: Never Used    Alcohol use No        Review of Systems   Constitutional: Negative for chills, diaphoresis and fever  HENT: Negative for congestion, rhinorrhea, sore throat and trouble swallowing  Respiratory: Negative for cough, chest tightness, shortness of breath and wheezing  Cardiovascular: Negative for chest pain, palpitations and leg swelling  Gastrointestinal: Negative for abdominal distention, abdominal pain, blood in stool, constipation, diarrhea, nausea and vomiting  Genitourinary: Negative for difficulty urinating, dysuria, flank pain, frequency, hematuria, urgency and vaginal bleeding  Musculoskeletal: Negative for arthralgias, back pain, myalgias, neck pain and neck stiffness     Skin: Positive for wound (Large left breast mass with bleeding)  Negative for pallor and rash  Neurological: Negative for dizziness, loss of consciousness, weakness, light-headedness and headaches  Hematological: Negative for adenopathy  Psychiatric/Behavioral: Negative for confusion  The patient is not nervous/anxious  All other systems reviewed and are negative  Physical Exam  Physical Exam   Constitutional: She is oriented to person, place, and time  She appears well-developed and well-nourished  She appears distressed (Moderately)  HENT:   Head: Normocephalic  Mouth/Throat: Oropharynx is clear and moist    Eyes: EOM are normal  Pupils are equal, round, and reactive to light  Moderate conjunctival pallor   Neck: Normal range of motion  Neck supple  Cardiovascular: Normal rate  No murmur heard  Tachycardic   Pulmonary/Chest: Effort normal and breath sounds normal  She exhibits tenderness (In the area of a large fungating left breast mass measuring approximately 12 cm in diameter that is cavitary is actively oozing dark red blood)  Mildly tachypneic   Abdominal: Soft  Bowel sounds are normal  She exhibits no distension  There is no tenderness  Musculoskeletal: Normal range of motion  She exhibits no edema or tenderness  Lymphadenopathy:     She has no cervical adenopathy  Neurological: She is alert and oriented to person, place, and time  She has normal reflexes  No cranial nerve deficit  She exhibits normal muscle tone  Skin: Skin is warm and dry  Capillary refill takes 2 to 3 seconds  No rash noted  She is not diaphoretic  No erythema  No pallor  See chest for fungating breast mass description     Psychiatric: She has a normal mood and affect  Her behavior is normal  Thought content normal    Vitals reviewed        Vital Signs  ED Triage Vitals [07/18/18 1424]   Temperature Pulse Respirations Blood Pressure SpO2   97 6 °F (36 4 °C) (!) 113 22 134/63 100 %      Temp Source Heart Rate Source Patient Position - Orthostatic VS BP Location FiO2 (%)   Temporal Monitor Lying Right arm --      Pain Score       Worst Possible Pain           Vitals:    07/18/18 1925 07/18/18 1930 07/18/18 1945 07/18/18 2015   BP: 139/64 135/67 137/67 126/61   Pulse: (!) 108 (!) 109 (!) 116 (!) 110   Patient Position - Orthostatic VS:           Visual Acuity      ED Medications  Medications   sodium chloride 0 9 % infusion (not administered)   cefepime (MAXIPIME) IVPB (premix) 2,000 mg (not administered)   vancomycin (VANCOCIN) IVPB (premix) 1,000 mg (not administered)   sodium chloride 0 9 % bolus 1,000 mL (0 mL Intravenous Stopped 7/18/18 1537)   fentanyl citrate (PF) 100 MCG/2ML 50 mcg (50 mcg Intravenous Given 7/18/18 1443)   LORazepam (ATIVAN) 2 mg/mL injection 0 5 mg (0 5 mg Intravenous Given 7/18/18 1716)   iohexol (OMNIPAQUE) 350 MG/ML injection (SINGLE-DOSE) 100 mL (100 mL Intravenous Given 7/18/18 1744)       Diagnostic Studies  Results Reviewed     Procedure Component Value Units Date/Time    MRSA culture [[id number]]     Lab Status:  No result Specimen:  Nares from Nose     Lactic acid, plasma [[id number]]     Lab Status:  No result Specimen:  Blood     Troponin I [[id number]]     Lab Status:  No result Specimen:  Blood     Procalcitonin [[id number]]     Lab Status:  No result Specimen:  Blood     [place] draw [[id number]] Collected:  07/18/18 1439    Lab Status:  Final result Specimen:  Blood Updated:  07/18/18 1602    Narrative: The following orders were created for panel order [place] draw  Procedure                               Abnormality         Status                     ---------                               -----------         ------                     Shanda Rear top on USLS[[id number]]                                  Final result               Green / Black tube on DXAG[[id number]]                        Final result                 Please view results for these tests on the individual orders      CBC and differential [[id number]]  (Abnormal) Collected:  07/18/18 1439    Lab Status:  Final result Specimen:  Blood from Arm, Right Updated:  07/18/18 1511     WBC 24 11 (H) Thousand/uL      RBC 2 81 (L) Million/uL      Hemoglobin 6 1 (LL) g/dL      Hematocrit 20 5 (L) %      MCV 73 (L) fL      MCH 21 7 (L) pg      MCHC 29 8 (L) g/dL      RDW 15 2 (H) %      MPV 9 2 fL      Platelets 235 (H) Thousands/uL     Comprehensive metabolic panel [[id number]]  (Abnormal) Collected:  07/18/18 1439    Lab Status:  Final result Specimen:  Blood from Arm, Right Updated:  07/18/18 1500     Sodium 133 (L) mmol/L      Potassium 3 7 mmol/L      Chloride 99 (L) mmol/L      CO2 25 mmol/L      Anion Gap 9 mmol/L      BUN 13 mg/dL      Creatinine 0 88 mg/dL      Glucose 147 (H) mg/dL      Calcium 9 6 mg/dL      AST 15 U/L      ALT 16 U/L      Alkaline Phosphatase 71 U/L      Total Protein 6 7 g/dL      Albumin 2 7 (L) g/dL      Total Bilirubin 0 40 mg/dL      eGFR 74 ml/min/1 73sq m     Narrative:         National Kidney Disease Education Program recommendations are as follows:  GFR calculation is accurate only with a steady state creatinine  Chronic Kidney disease less than 60 ml/min/1 73 sq  meters  Kidney failure less than 15 ml/min/1 73 sq  meters  Protime-INR [[id number]]  (Abnormal) Collected:  07/18/18 1439    Lab Status:  Final result Specimen:  Blood from Arm, Right Updated:  07/18/18 1459     Protime 15 9 (H) seconds      INR 1 34 (H)    APTT [[id number]]  (Abnormal) Collected:  07/18/18 1439    Lab Status:  Final result Specimen:  Blood from Arm, Right Updated:  07/18/18 1459     PTT 50 (H) seconds                  CTA chest ct abdomen pelvis w contrast   Final Result by Amina Rosas MD (07/18 1823)      Irregular highly aggressive appearing left breast mass/neoplasm infiltrating into the left pleura and causing destruction of the left 4th and 5th ribs  Left axillary metastatic lymphadenopathy        Multifocal lung masses identified could represent metastatic disease and/or pneumonia  Moderate left pleural effusion  Left basilar consolidation representing atelectasis and/or pneumonia  Bilateral hilar lymphadenopathy/metastatic disease  No evidence of filling defect in the pulmonary trunk or main pulmonary arteries  Right lower lobe mass appears to infiltrate the right pulmonary arteries in the right lower lobe  Left hilar mass infiltrates and causes constriction/stenosis of the left    lobe are pulmonary arteries  Right gluteal centrally necrotic masses/metastatic disease  Nonspecific 1 2 cm nodule identified posterior to the left gluteus kellee muscle  Lucent lesion to the inferior anterior aspect of the L2 vertebral body could relate to metastatic disease  Workstation performed: [id number]                    Procedures  Procedures       Phone Contacts  ED Phone Contact    ED Course  ED Course as of Jul 18 2055 Wed Jul 18, 2018   1529 Previously normal  Will transfuse  Hemoglobin: (!!) 6 1   1810 Likelihood that the patient would require services not available at [facility] was again discussed with the patient  She continues to refuse transfer to [facility] or [facility], citing that they are too far away  Awaiting CT results  Case preliminarily d/w Dr Owen Polanco who may admit to provide limited care for patient rather than risk her s/o AMA  MDM  Number of Diagnoses or Management Options  Diagnosis management comments: DDx:  Bleeding from fungating left breast mass likely exacerbated by Lovenox injections, left upper extremity DVT with possible PE, anemia of chronic disease verses acute blood loss, possible metastatic disease  A/P: Will check CTA chest, labs including coags, wound was packed with surgifoam and covered with gauze, treat pain with fentanyl, hydrate, reevaluate for further work up and disposition         Amount and/or Complexity of Data Reviewed  Clinical lab tests: ordered and reviewed  Tests in the radiology section of CPT®: ordered and reviewed  Review and summarize past medical records: yes      The patient presented with a condition in which there was a high probability of imminent or life-threatening deterioration, and critical care services (excluding separately billable procedures) totalled 30-74 minutes  Disposition  Final diagnoses:   Large mass of left breast   Anemia   Metastatic breast cancer (HCC)   Pleural effusion on left     Time reflects when diagnosis was documented in both MDM as applicable and the Disposition within this note     Time User Action Codes Description Comment    7/18/2018  6:14 PM [address],[place]  [address], [address] [[address] 20] Large mass of left breast     7/18/2018  6:14 PM [address],[place]  [address], [address] [[address] 9] Anemia     7/18/2018  7:02 PM [address],[place]  [address], [address] Metastatic breast cancer ([address] )     7/18/2018  7:02 PM [address] [J90] Pleural effusion on left     7/18/2018  8:55 PM [address] [[address]] Large mass of left breast     7/18/2018  8:55 PM [address],[place]  [address], [facility] [address] [[address]] Anemia       ED Disposition     ED Disposition Condition Comment    Admit  Case was discussed with Dr Sourav Casarez and the patient's admission status was agreed to be Admission Status: inpatient status to the service of Dr Sourav Casarez            Follow-up Information    None         Current Discharge Medication List      CONTINUE these medications which have NOT CHANGED    Details   acetaminophen-codeine (TYLENOL/CODEINE #3) 300-30 MG per tablet Take 1 tablet by mouth every 6 (six) hours as needed for moderate pain  Qty: 120 tablet, Refills: 2    Comments: Discontinue Tramadol  Associated Diagnoses: Breast pain, left      Calcium Carbonate-Vitamin D3 (CALCIUM 600/VITAMIN D) 600-400 MG-UNIT TABS Take 1 tablet by mouth 2 (two) times a day  Qty: 60 tablet, Refills: 5    Associated Diagnoses: Vitamin D deficiency      Cholecalciferol (VITAMIN D3) 78988 units CAPS Take 1 capsule (50,000 Units total) by mouth 2 (two) times a week  Qty: 8 capsule, Refills: 5    Associated Diagnoses: Vitamin D deficiency      cyanocobalamin (VITAMIN B-12) 1,000 mcg tablet Take 1 tablet (1,000 mcg total) by mouth daily  Qty: 30 tablet, Refills: 5    Associated Diagnoses: Vitamin B 12 deficiency      EPINEPHrine (EPIPEN) 0 3 mg/0 3 mL SOAJ Inject 0 3 mL (0 3 mg total) into the shoulder, thigh, or buttocks once for 1 dose  Qty: 0 3 mL, Refills: 3    Associated Diagnoses: Bee sting allergy      fluticasone (FLONASE) 50 mcg/act nasal spray 1 spray into each nostril daily  Qty: 16 g, Refills: 5    Associated Diagnoses: Seasonal allergic rhinitis due to other allergic trigger      ibuprofen (MOTRIN) 600 mg tablet Take 1 tablet (600 mg total) by mouth every 6 (six) hours as needed for mild pain for up to 10 days  Qty: 120 tablet, Refills: 5    Associated Diagnoses: Breast pain, left;  Thoracogenic scoliosis of thoracolumbar region; Chronic bilateral low back pain without sciatica      lisinopril (ZESTRIL) 30 mg tablet Take 1 tablet (30 mg total) by mouth daily  Qty: 30 tablet, Refills: 5    Associated Diagnoses: Essential hypertension      loratadine (CLARITIN) 10 mg tablet Take 1 tablet (10 mg total) by mouth daily  Qty: 30 tablet, Refills: 5    Associated Diagnoses: Seasonal allergic rhinitis due to other allergic trigger      lovastatin (MEVACOR) 20 mg tablet Take 1 tablet (20 mg total) by mouth daily at bedtime  Qty: 30 tablet, Refills: 5    Comments: Discontinue Simvastatin  Associated Diagnoses: Mixed hyperlipidemia      melatonin 3 mg Take 1 tablet (3 mg total) by mouth daily at bedtime  Qty: 30 tablet, Refills: 5    Associated Diagnoses: Other insomnia      Multiple Vitamins-Minerals (MULTIVITAMIN ADULT) TABS Take 1 tablet by mouth daily  Qty: 30 tablet, Refills: 5    Associated Diagnoses: Healthcare maintenance      ranitidine (ZANTAC) 150 mg tablet Take 1 tablet (150 mg total) by mouth 2 (two) times a day  Qty: 60 tablet, Refills: 5 Associated Diagnoses: Gastroesophageal reflux disease, esophagitis presence not specified      tiZANidine (ZANAFLEX) 4 mg tablet Take 1 tablet (4 mg total) by mouth every 8 (eight) hours as needed for muscle spasms  Qty: 90 tablet, Refills: 5    Comments: Discontinue Baclofen  Associated Diagnoses: Muscle spasm           No discharge procedures on file      ED Provider  Electronically Signed by           Claudia Bermeo DO  07/18/18 DO Yao  07/18/18 6254

## 2018-07-18 NOTE — ED NOTES
Dr Randall Mcwilliams at bedside to speak with patient about admission  Patient currently eating food offering no complaints        Josemanuel Lopez RN  07/18/18 1932

## 2018-07-19 ENCOUNTER — APPOINTMENT (INPATIENT)
Dept: NON INVASIVE DIAGNOSTICS | Facility: HOSPITAL | Age: 58
DRG: 720 | End: 2018-07-19
Payer: COMMERCIAL

## 2018-07-19 LAB
ABO GROUP BLD BPU: NORMAL
ABO GROUP BLD BPU: NORMAL
ALBUMIN SERPL BCP-MCNC: 2.2 G/DL (ref 3.5–5)
ALP SERPL-CCNC: 58 U/L (ref 46–116)
ALT SERPL W P-5'-P-CCNC: 13 U/L (ref 12–78)
ANION GAP SERPL CALCULATED.3IONS-SCNC: 8 MMOL/L (ref 4–13)
AST SERPL W P-5'-P-CCNC: 14 U/L (ref 5–45)
ATRIAL RATE: 100 BPM
ATRIAL RATE: 99 BPM
BACTERIA UR QL AUTO: ABNORMAL /HPF
BASOPHILS # BLD MANUAL: 0 THOUSAND/UL (ref 0–0.1)
BASOPHILS NFR MAR MANUAL: 0 % (ref 0–1)
BILIRUB SERPL-MCNC: 0.8 MG/DL (ref 0.2–1)
BILIRUB UR QL STRIP: NEGATIVE
BPU ID: NORMAL
BPU ID: NORMAL
BUN SERPL-MCNC: 10 MG/DL (ref 5–25)
CALCIUM SERPL-MCNC: 8.6 MG/DL (ref 8.3–10.1)
CHLORIDE SERPL-SCNC: 103 MMOL/L (ref 100–108)
CK SERPL-CCNC: 38 U/L (ref 26–192)
CLARITY UR: CLEAR
CO2 SERPL-SCNC: 23 MMOL/L (ref 21–32)
COLOR UR: YELLOW
CREAT SERPL-MCNC: 0.64 MG/DL (ref 0.6–1.3)
CROSSMATCH: NORMAL
CROSSMATCH: NORMAL
EOSINOPHIL # BLD MANUAL: 0 THOUSAND/UL (ref 0–0.4)
EOSINOPHIL NFR BLD MANUAL: 0 % (ref 0–6)
ERYTHROCYTE [DISTWIDTH] IN BLOOD BY AUTOMATED COUNT: 16.8 % (ref 11.6–15.1)
EST. AVERAGE GLUCOSE BLD GHB EST-MCNC: 94 MG/DL
GFR SERPL CREATININE-BSD FRML MDRD: 101 ML/MIN/1.73SQ M
GLUCOSE SERPL-MCNC: 107 MG/DL (ref 65–140)
GLUCOSE UR STRIP-MCNC: NEGATIVE MG/DL
HBA1C MFR BLD: 4.9 % (ref 4.2–6.3)
HCT VFR BLD AUTO: 24.6 % (ref 34.8–46.1)
HCT VFR BLD AUTO: 25.1 % (ref 34.8–46.1)
HGB BLD-MCNC: 7.9 G/DL (ref 11.5–15.4)
HGB BLD-MCNC: 8.1 G/DL (ref 11.5–15.4)
HGB UR QL STRIP.AUTO: NEGATIVE
HYPERCHROMIA BLD QL SMEAR: PRESENT
KETONES UR STRIP-MCNC: NEGATIVE MG/DL
L PNEUMO1 AG UR QL IA.RAPID: NEGATIVE
LEUKOCYTE ESTERASE UR QL STRIP: NEGATIVE
LYMPHOCYTES # BLD AUTO: 16 % (ref 14–44)
LYMPHOCYTES # BLD AUTO: 2.6 THOUSAND/UL (ref 0.6–4.47)
MAGNESIUM SERPL-MCNC: 2 MG/DL (ref 1.6–2.6)
MCH RBC QN AUTO: 24.8 PG (ref 26.8–34.3)
MCHC RBC AUTO-ENTMCNC: 32.3 G/DL (ref 31.4–37.4)
MCV RBC AUTO: 77 FL (ref 82–98)
MONOCYTES # BLD AUTO: 0.81 THOUSAND/UL (ref 0–1.22)
MONOCYTES NFR BLD: 5 % (ref 4–12)
NEUTROPHILS # BLD MANUAL: 12.81 THOUSAND/UL (ref 1.85–7.62)
NEUTS SEG NFR BLD AUTO: 79 % (ref 43–75)
NITRITE UR QL STRIP: NEGATIVE
NON-SQ EPI CELLS URNS QL MICRO: ABNORMAL /HPF
P AXIS: 39 DEGREES
P AXIS: 58 DEGREES
PH UR STRIP.AUTO: 6 [PH] (ref 4.5–8)
PHOSPHATE SERPL-MCNC: 3.6 MG/DL (ref 2.7–4.5)
PLATELET # BLD AUTO: 475 THOUSANDS/UL (ref 149–390)
PLATELET BLD QL SMEAR: ABNORMAL
PMV BLD AUTO: 9.5 FL (ref 8.9–12.7)
POTASSIUM SERPL-SCNC: 3.8 MMOL/L (ref 3.5–5.3)
PR INTERVAL: 120 MS
PR INTERVAL: 144 MS
PROCALCITONIN SERPL-MCNC: 0.16 NG/ML
PROCALCITONIN SERPL-MCNC: 0.18 NG/ML
PROT SERPL-MCNC: 5.5 G/DL (ref 6.4–8.2)
PROT UR STRIP-MCNC: NEGATIVE MG/DL
QRS AXIS: 24 DEGREES
QRS AXIS: 27 DEGREES
QRSD INTERVAL: 76 MS
QRSD INTERVAL: 82 MS
QT INTERVAL: 300 MS
QT INTERVAL: 306 MS
QTC INTERVAL: 387 MS
QTC INTERVAL: 392 MS
RBC # BLD AUTO: 3.26 MILLION/UL (ref 3.81–5.12)
RBC #/AREA URNS AUTO: ABNORMAL /HPF
S PNEUM AG UR QL: NEGATIVE
SODIUM SERPL-SCNC: 134 MMOL/L (ref 136–145)
SP GR UR STRIP.AUTO: <=1.005 (ref 1–1.03)
T WAVE AXIS: -1 DEGREES
T WAVE AXIS: -2 DEGREES
TOTAL CELLS COUNTED SPEC: 100
TROPONIN I SERPL-MCNC: 0.27 NG/ML
TROPONIN I SERPL-MCNC: 0.33 NG/ML
TSH SERPL DL<=0.05 MIU/L-ACNC: 3.23 UIU/ML (ref 0.36–3.74)
UNIT DISPENSE STATUS: NORMAL
UNIT DISPENSE STATUS: NORMAL
UNIT PRODUCT CODE: NORMAL
UNIT PRODUCT CODE: NORMAL
UNIT RH: NORMAL
UNIT RH: NORMAL
UROBILINOGEN UR QL STRIP.AUTO: 0.2 E.U./DL
VENTRICULAR RATE: 100 BPM
VENTRICULAR RATE: 99 BPM
WBC # BLD AUTO: 16.22 THOUSAND/UL (ref 4.31–10.16)
WBC #/AREA URNS AUTO: ABNORMAL /HPF

## 2018-07-19 PROCEDURE — 93010 ELECTROCARDIOGRAM REPORT: CPT | Performed by: INTERNAL MEDICINE

## 2018-07-19 PROCEDURE — 97163 PT EVAL HIGH COMPLEX 45 MIN: CPT | Performed by: PHYSICAL THERAPIST

## 2018-07-19 PROCEDURE — G8978 MOBILITY CURRENT STATUS: HCPCS | Performed by: PHYSICAL THERAPIST

## 2018-07-19 PROCEDURE — 97116 GAIT TRAINING THERAPY: CPT | Performed by: PHYSICAL THERAPIST

## 2018-07-19 PROCEDURE — 84484 ASSAY OF TROPONIN QUANT: CPT | Performed by: INTERNAL MEDICINE

## 2018-07-19 PROCEDURE — 82550 ASSAY OF CK (CPK): CPT | Performed by: INTERNAL MEDICINE

## 2018-07-19 PROCEDURE — 93005 ELECTROCARDIOGRAM TRACING: CPT

## 2018-07-19 PROCEDURE — 99254 IP/OBS CNSLTJ NEW/EST MOD 60: CPT | Performed by: SURGERY

## 2018-07-19 PROCEDURE — 84100 ASSAY OF PHOSPHORUS: CPT | Performed by: INTERNAL MEDICINE

## 2018-07-19 PROCEDURE — 85014 HEMATOCRIT: CPT | Performed by: INTERNAL MEDICINE

## 2018-07-19 PROCEDURE — 84443 ASSAY THYROID STIM HORMONE: CPT | Performed by: INTERNAL MEDICINE

## 2018-07-19 PROCEDURE — 93306 TTE W/DOPPLER COMPLETE: CPT

## 2018-07-19 PROCEDURE — 87086 URINE CULTURE/COLONY COUNT: CPT | Performed by: INTERNAL MEDICINE

## 2018-07-19 PROCEDURE — 81001 URINALYSIS AUTO W/SCOPE: CPT | Performed by: INTERNAL MEDICINE

## 2018-07-19 PROCEDURE — 80053 COMPREHEN METABOLIC PANEL: CPT | Performed by: INTERNAL MEDICINE

## 2018-07-19 PROCEDURE — 99233 SBSQ HOSP IP/OBS HIGH 50: CPT | Performed by: INTERNAL MEDICINE

## 2018-07-19 PROCEDURE — G8979 MOBILITY GOAL STATUS: HCPCS | Performed by: PHYSICAL THERAPIST

## 2018-07-19 PROCEDURE — 83735 ASSAY OF MAGNESIUM: CPT | Performed by: INTERNAL MEDICINE

## 2018-07-19 PROCEDURE — 84145 PROCALCITONIN (PCT): CPT | Performed by: INTERNAL MEDICINE

## 2018-07-19 PROCEDURE — 87449 NOS EACH ORGANISM AG IA: CPT | Performed by: INTERNAL MEDICINE

## 2018-07-19 PROCEDURE — 85027 COMPLETE CBC AUTOMATED: CPT | Performed by: INTERNAL MEDICINE

## 2018-07-19 PROCEDURE — 85007 BL SMEAR W/DIFF WBC COUNT: CPT | Performed by: INTERNAL MEDICINE

## 2018-07-19 PROCEDURE — 85018 HEMOGLOBIN: CPT | Performed by: INTERNAL MEDICINE

## 2018-07-19 PROCEDURE — 93306 TTE W/DOPPLER COMPLETE: CPT | Performed by: INTERNAL MEDICINE

## 2018-07-19 PROCEDURE — 83036 HEMOGLOBIN GLYCOSYLATED A1C: CPT | Performed by: INTERNAL MEDICINE

## 2018-07-19 RX ADMIN — MULTIPLE VITAMINS W/ MINERALS TAB 1 TABLET: TAB at 09:01

## 2018-07-19 RX ADMIN — FLUTICASONE PROPIONATE 1 SPRAY: 50 SPRAY, METERED NASAL at 09:02

## 2018-07-19 RX ADMIN — LORATADINE 10 MG: 10 TABLET ORAL at 09:01

## 2018-07-19 RX ADMIN — ASPIRIN 81 MG 81 MG: 81 TABLET ORAL at 09:00

## 2018-07-19 RX ADMIN — CEFEPIME HYDROCHLORIDE 2000 MG: 2 INJECTION, SOLUTION INTRAVENOUS at 21:32

## 2018-07-19 RX ADMIN — MORPHINE SULFATE 2 MG: 2 INJECTION, SOLUTION INTRAMUSCULAR; INTRAVENOUS at 03:09

## 2018-07-19 RX ADMIN — OXYCODONE HYDROCHLORIDE 5 MG: 5 TABLET ORAL at 13:30

## 2018-07-19 RX ADMIN — CALCIUM CARBONATE-VITAMIN D TAB 500 MG-200 UNIT 1 TABLET: 500-200 TAB at 09:01

## 2018-07-19 RX ADMIN — OXYCODONE HYDROCHLORIDE 5 MG: 5 TABLET ORAL at 20:23

## 2018-07-19 RX ADMIN — FAMOTIDINE 20 MG: 20 TABLET ORAL at 09:01

## 2018-07-19 RX ADMIN — CALCIUM CARBONATE-VITAMIN D TAB 500 MG-200 UNIT 1 TABLET: 500-200 TAB at 17:22

## 2018-07-19 RX ADMIN — CEFEPIME HYDROCHLORIDE 2000 MG: 2 INJECTION, SOLUTION INTRAVENOUS at 12:04

## 2018-07-19 RX ADMIN — LISINOPRIL 30 MG: 20 TABLET ORAL at 09:01

## 2018-07-19 RX ADMIN — FAMOTIDINE 20 MG: 20 TABLET ORAL at 17:22

## 2018-07-19 RX ADMIN — VANCOMYCIN HYDROCHLORIDE 1000 MG: 1 INJECTION, POWDER, LYOPHILIZED, FOR SOLUTION INTRAVENOUS at 22:28

## 2018-07-19 RX ADMIN — CYANOCOBALAMIN TAB 500 MCG 500 MCG: 500 TAB at 09:01

## 2018-07-19 RX ADMIN — MELATONIN TAB 3 MG 3 MG: 3 TAB at 21:32

## 2018-07-19 RX ADMIN — VANCOMYCIN HYDROCHLORIDE 1000 MG: 1 INJECTION, POWDER, LYOPHILIZED, FOR SOLUTION INTRAVENOUS at 10:49

## 2018-07-19 RX ADMIN — OXYCODONE HYDROCHLORIDE 5 MG: 5 TABLET ORAL at 05:53

## 2018-07-19 RX ADMIN — ATORVASTATIN CALCIUM 40 MG: 40 TABLET, FILM COATED ORAL at 17:22

## 2018-07-19 NOTE — ASSESSMENT & PLAN NOTE
-hold anticoagulation for now in the setting of acute blood loss anemia  -we will need to discuss the case with Vascular Surgery and Interventional Radiology to see if there is any other alternative treatment of this extensive deep vein thrombosis of the left upper extremity  -consult Hematology/Oncology  -for anticoagulation choices, Lovenox is the treatment of choice in the setting of an active malignancy  -restart anticoagulation on 07/19/2018 if her hemoglobin/hematocrit levels are stable

## 2018-07-19 NOTE — PHYSICAL THERAPY NOTE
PT Treatment     07/19/18 1156   Note Type   Note type Eval/Treat   Pain Assessment   Pain Assessment No/denies pain   Pain Score No Pain   Home Living   Type of Home House   Home Layout Multi-level;Bed/bath upstairs;Stairs to enter with rails  (4 JAREN with HR, 14 steps to 2nd no HR)   Bathroom Shower/Tub Tub/shower unit   Bathroom Toilet Standard   Bathroom Accessibility Accessible   Home Equipment (No DME)   Prior Function   Level of Bath Independent with ADLs and functional mobility  ((I) ambulation no A  D )   Lives With Significant other   ADL Assistance Independent  (occassional assistance with ADL's)   IADLs Independent  (pt and sign other share IADL's)   Comments (I) with driving   Restrictions/Precautions   Other Precautions Contact/isolation;Multiple lines;Telemetry; Fall Risk   General   Family/Caregiver Present No   Cognition   Arousal/Participation Alert   Orientation Level Oriented X4   Following Commands Follows all commands and directions without difficulty   RLE Assessment   RLE Assessment WFL  (hip 4-/5 knee 4 to 4-/5 ankle 4-/5)   LLE Assessment   LLE Assessment WFL  (hip 4-/5 knee 4 to 4-/5 ankle 4-/5)   Coordination   Sensation [facility]   Light Touch   RLE Light Touch Grossly intact   LLE Light Touch Grossly intact   Bed Mobility   Supine to Sit 7  Independent   Sit to Supine 7  Independent   Additional Comments no difficulty with bed mobility or transfers   Transfers   Sit to Stand 7  Independent   Stand to Sit 7  Independent   Stand pivot 7  Independent   Toilet transfer 7  Independent   Additional items ((I) with clothing management, self care, handwashing)   Additional Comments no LOB or instability with transfers   Ambulation/Elevation   Gait pattern Narrow UNIQUE; Short stride   Gait Assistance 5  Supervision   Assistive Device None   Distance 60ft no A D   Supervision no LOB or instability during ambulation   Balance   Static Sitting Good   Dynamic Sitting Good   Static Standing Good Dynamic Standing Fair +   Ambulatory Fair +   Endurance Deficit   Endurance Deficit Yes   Endurance Deficit Description ambulation limited by fatigue and weakness   Activity Tolerance   Activity Tolerance Patient limited by fatigue   Assessment   Prognosis Good   Problem List Decreased strength;Decreased endurance; Impaired balance;Decreased mobility   Assessment Pt is a 56year old female presenting with decreased LE strength endurance balance and ambulation tolerance  Pt with fair to good balance during performance of ADL's and during ambulation with no LOB during gait  Pt is in need of continued activity in PT to improve all impairments and functional mobility with return to (I) LOF  Goals   Patient Goals To feel better and return home   STG Expiration Date 07/26/18   Short Term Goal #1 (I) with ambulation 400ft no A D  no LOB   Short Term Goal #2 Pt will ascend/descend 11 steps with HR (I)    Plan   Treatment/Interventions Functional transfer training;LE strengthening/ROM; Elevations; Therapeutic exercise; Endurance training;Patient/family training;Bed mobility;Gait training   PT Frequency (3-5x/wk)   Recommendation   Recommendation Home independently   PT - OK to Discharge Yes  (when medically stable for discharge)   Pt with SCD's on when PT entered room  SCD's reapplied and turned on with pt supine in bed with call bell in reach

## 2018-07-19 NOTE — ASSESSMENT & PLAN NOTE
-consult Hematology/Oncology  -the patient is scheduled for an outpatient appointment in [address] [place] [address] with Hematology/Oncology  -the patient was not aware of the severity of her disease

## 2018-07-19 NOTE — ASSESSMENT & PLAN NOTE
-admit to med/surg level of care  -the acute blood loss anemia secondary to bleeding from her abdominal wall after a Lovenox injection as well as bleeding from her left breast wound  -transfuse 2 units of packed red blood cells at this time  -follow the hemoglobin/hematocrit levels  -hold full anticoagulation for now

## 2018-07-19 NOTE — H&P
H&P- Kristie Vaughn [birth year], 56 y o  female MRN: [id number]    Unit/Bed#: 403-01 Encounter: [id number]    Primary Care Provider: CLINTON Boston   Date and time admitted to hospital: 7/18/2018  2:13 PM        * Acute blood loss anemia   Assessment & Plan    -admit to med/surg level of care  -the acute blood loss anemia secondary to bleeding from her abdominal wall after a Lovenox injection as well as bleeding from her left breast wound  -transfuse 2 units of packed red blood cells at this time  -follow the hemoglobin/hematocrit levels  -hold full anticoagulation for now        Sepsis [facility])   Assessment & Plan    -possible sepsis present on admission secondary to possible post-obstructive pneumonia  -check blood cultures x 2 sets  -check a urine culture  -check a lactic acid level  -check and follow the procalcitonin level  -normal saline IV fluids  -initiate broad-spectrum antibiotics IV cefepime and IV vancomycin for now  -consult Pulmonology in the setting of a pleural effusion        Metastatic breast cancer [facility])   Assessment & Plan    -consult Hematology/Oncology  -the patient is scheduled for an outpatient appointment in [place] with Hematology/Oncology  -the patient was not aware of the severity of her disease        Acute deep vein thrombosis (DVT) of left upper extremity ([address] )   Assessment & Plan    -hold anticoagulation for now in the setting of acute blood loss anemia  -we will need to discuss the case with Vascular Surgery and Interventional Radiology to see if there is any other alternative treatment of this extensive deep vein thrombosis of the left upper extremity  -consult Hematology/Oncology  -for anticoagulation choices, Lovenox is the treatment of choice in the setting of an active malignancy  -restart anticoagulation on 07/19/2018 if her hemoglobin/hematocrit levels are stable        Elevated platelet count   Assessment & Plan    -reactive in the setting of acute blood loss anemia, an active malignancy, and possible sepsis  -follow the CBC        Hyperglycemia   Assessment & Plan    -check a HgbA1c level  -outpatient follow-up with her PCP in regards to this matter        Hypercholesterolemia   Assessment & Plan    -continue statin therapy        Essential hypertension   Assessment & Plan    -continue p o  lisinopril  -follow her blood pressure trend        Vitamin D deficiency   Assessment & Plan    -continue vitamin D supplementation  -outpatient follow-up with her PCP in regards to this matter        Vitamin B 12 deficiency   Assessment & Plan    -continue cyanocobalamin supplementation  -outpatient follow-up with her PCP in regards to this matter        Breast wound, left, sequela   Assessment & Plan    -consult the wound care team  -outpatient follow-up with Surgical Oncology              VTE Prophylaxis: Pharmacologic VTE Prophylaxis contraindicated due to acute blood loss anemia and active hemorrhage  / sequential compression device   Code Status:  Level 1-Full Code    Anticipated Length of Stay:  Patient will be admitted on an Inpatient basis with an anticipated length of stay of greater than 2 midnights  Justification for Hospital Stay:  The patient requires a packed red blood cell transfusion x 2 units, IV antibiotics, IV fluids, and serial laboratory testing to monitor her hemoglobin/hematocrit levels  Total Time for Visit, including Counseling / Coordination of Care: 45 minutes  Greater than 50% of this total time spent on direct patient counseling and coordination of care  Chief Complaint:  Bleeding from abdominal wall    History of Present Illness: Alexis Simon is a 56 y o  female who presents to the emergency department the complaints of bleeding from her abdominal wall after a home subcutaneous Lovenox injection  The patient was recently diagnosed with breast cancer approximately 3-4 weeks ago    She was seen in the emergency department at [facility] ([id number]) on Monday, 2018, for left upper extremity edema  She was diagnosed with an acute, extensive deep vein thrombosis of the left upper extremity  She was initiated on subcutaneous Lovenox injections  She developed a significant amount of bleeding from the abdominal wall at the site of Lovenox injection this morning, 2018  She also has been having significant bleeding from her left breast wound over the last few weeks  She was scheduled to see Hematology/Oncology this week in [place], [place], to discuss the initiation of chemotherapy  She continues to experience edema of her left upper extremity as well as pain throughout the left upper extremity  She was utilizing multiple bandages on her abdominal wall bleeding without any relief of the active hemorrhage, so she presented to the emergency department today, 2018  Nothing seemed to improve her symptoms  She also complains of a productive cough, shortness of breath, and generalized weakness  Review of Systems:    Review of Systems:  Per HPI, all other systems have been reviewed and were negative  Past Medical and Surgical History:     Past Medical History:   Diagnosis Date    Arm fracture, left     casted    Blood clot in vein     left arm    Breast cancer, stage 3 (HCC)     Bronchitis     Chronic tonsillitis     History of abnormal mammogram 2017    Followed by [facility] and Dr Stephanie Johns History of Papanicolaou smear of cervix 2017    Followed by Virgen Timmons    Hyperlipidemia     Hypertension     Left sided sciatica     Meningitis     Mitral regurgitation     Muscle strain of left lower leg        Past Surgical History:   Procedure Laterality Date    APPENDECTOMY      BREAST SURGERY Left 2017    cyst removed    TONSILLECTOMY         Meds/Allergies:    Prior to Admission medications    Medication Sig Start Date End Date Taking?  Authorizing Provider   acetaminophen-codeine (TYLENOL/CODEINE #3) 300-30 MG per tablet Take 1 tablet by mouth every 6 (six) hours as needed for moderate pain 6/26/18  Yes CLINTON Mcintyre   Calcium Carbonate-Vitamin D3 (CALCIUM 600/VITAMIN D) 600-400 MG-UNIT TABS Take 1 tablet by mouth 2 (two) times a day 6/26/18  Yes CLINTON Mcintyre   Cholecalciferol (VITAMIN D3) 54701 units CAPS Take 1 capsule (50,000 Units total) by mouth 2 (two) times a week 6/28/18  Yes CLINTON Mcintyre   cyanocobalamin (VITAMIN B-12) 1,000 mcg tablet Take 1 tablet (1,000 mcg total) by mouth daily  Patient taking differently: Take 500 mcg by mouth 2 (two) times a week   6/26/18  Yes CLINTON Mcintyre   fluticasone [facility]) 50 mcg/act nasal spray 1 spray into each nostril daily 6/26/18  Yes CLINTON Mcintyre   ibuprofen (MOTRIN) 600 mg tablet Take 1 tablet (600 mg total) by mouth every 6 (six) hours as needed for mild pain for up to 10 days 6/26/18 7/18/18 Yes CLINTON Mcintyre   lisinopril (ZESTRIL) 30 mg tablet Take 1 tablet (30 mg total) by mouth daily 6/26/18  Yes CLINTON Mcintyre   loratadine (CLARITIN) 10 mg tablet Take 1 tablet (10 mg total) by mouth daily 6/26/18  Yes CLINTON Mcintyre   lovastatin (MEVACOR) 20 mg tablet Take 1 tablet (20 mg total) by mouth daily at bedtime 6/26/18  Yes CLINTON Mcintyre   melatonin 3 mg Take 1 tablet (3 mg total) by mouth daily at bedtime 6/26/18  Yes CLINTON Mcintyre   Multiple Vitamins-Minerals (MULTIVITAMIN ADULT) TABS Take 1 tablet by mouth daily 6/26/18  Yes CLINTON Mcintyre   ranitidine (ZANTAC) 150 mg tablet Take 1 tablet (150 mg total) by mouth 2 (two) times a day 6/26/18  Yes CLINTON Mcintyre   EPINEPHrine (EPIPEN) 0 3 mg/0 3 mL SOAJ Inject 0 3 mL (0 3 mg total) into the shoulder, thigh, or buttocks once for 1 dose 6/26/18 7/18/18  CLINTON Mcintyre   tiZANidine (ZANAFLEX) 4 mg tablet Take 1 tablet (4 mg total) by mouth every 8 (eight) hours as needed for muscle spasms 6/26/18   CLINTON Lazo     I have reviewed home medications with patient personally  Allergies: Allergies   Allergen Reactions    Bee Venom        Social History:     Marital Status:      Substance Use History:   History   Alcohol Use No     History   Smoking Status    Former Smoker    Packs/day: 0 25    Quit date: 6/27/2018   Smokeless Tobacco    Never Used     History   Drug Use No       Family History:    non-contributory    Physical Exam:     Vitals:   Blood Pressure: 124/60 (07/18/18 2132)  Pulse: 105 (07/18/18 2132)  Temperature: 98 3 °F (36 8 °C) (07/18/18 2132)  Temp Source: Temporal (07/18/18 2132)  Respirations: 16 (07/18/18 2132)  Height: 5' 2" (157 5 cm) (07/18/18 2102)  Weight - Scale: 64 1 kg (141 lb 5 oz) (07/18/18 2102)  SpO2: 98 % (07/18/18 2132)    Physical Exam  General:  NAD, awake, alert, oriented x 3  HEENT:  NC/AT, mucous membranes dry  Neck:  Supple, No JVP elevation  CV:  + S1, + S2, Tachycardic, Regular rhythm  Pulm:  Bibasilar crackles  Abd:  Soft, Non-tender, Non-distended  Ext:  No clubbing/cyanosis/edema  Neuro:  No focal deficits      Additional Data:     Lab Results: I have personally reviewed pertinent reports  Results from last 7 days  Lab Units 07/18/18  1439   WBC Thousand/uL 24 11*   HEMOGLOBIN g/dL 6 1*   HEMATOCRIT % 20 5*   PLATELETS Thousands/uL 766*   LYMPHO PCT % 6*   MONO PCT MAN % 3*   EOSINO PCT MANUAL % 0       Results from last 7 days  Lab Units 07/18/18  1439   SODIUM mmol/L 133*   POTASSIUM mmol/L 3 7   CHLORIDE mmol/L 99*   CO2 mmol/L 25   BUN mg/dL 13   CREATININE mg/dL 0 88   CALCIUM mg/dL 9 6   TOTAL PROTEIN g/dL 6 7   BILIRUBIN TOTAL mg/dL 0 40   ALK PHOS U/L 71   ALT U/L 16   AST U/L 15   GLUCOSE RANDOM mg/dL 147*       Results from last 7 days  Lab Units 07/18/18  1439   INR  1 34*               Imaging: I have personally reviewed pertinent reports        CTA chest ct abdomen pelvis w contrast   Final Result by Lopez Nicholson MD (07/18 1823)      Irregular highly aggressive appearing left breast mass/neoplasm infiltrating into the left pleura and causing destruction of the left 4th and 5th ribs  Left axillary metastatic lymphadenopathy  Multifocal lung masses identified could represent metastatic disease and/or pneumonia  Moderate left pleural effusion  Left basilar consolidation representing atelectasis and/or pneumonia  Bilateral hilar lymphadenopathy/metastatic disease  No evidence of filling defect in the pulmonary trunk or main pulmonary arteries  Right lower lobe mass appears to infiltrate the right pulmonary arteries in the right lower lobe  Left hilar mass infiltrates and causes constriction/stenosis of the left    lobe are pulmonary arteries  Right gluteal centrally necrotic masses/metastatic disease  Nonspecific 1 2 cm nodule identified posterior to the left gluteus kellee muscle  Lucent lesion to the inferior anterior aspect of the L2 vertebral body could relate to metastatic disease  Workstation performed: [id number]               Allscripts / Epic Records Reviewed: No     ** Please Note: This note has been constructed using a voice recognition system   **

## 2018-07-19 NOTE — ASSESSMENT & PLAN NOTE
-continue cyanocobalamin supplementation  -outpatient follow-up with her PCP in regards to this matter

## 2018-07-19 NOTE — PROGRESS NOTES
The patient's first troponin level was found to be elevated with the following result:  Results for Susan Pryor (MRN [id number]) as of 7/18/2018 23:42   Ref  Range 7/18/2018 22:17   Troponin I Latest Ref Range: <=0 04 ng/mL 0 30 ()     We will follow troponin levels x 3 sets  Check an EKG now  Check an echocardiogram   She may need a Cardiology consultation  The elevated troponin level is likely in the setting of sepsis and in the setting of an active malignancy  Initiate aspirin 81 mg daily in the morning if there are no signs of active bleeding  Change the patient's statin therapy to high-intensity statin therapy with atorvastatin 40 mg daily in the setting of an elevated troponin level

## 2018-07-19 NOTE — PLAN OF CARE
Problem: Potential for Falls  Goal: Patient will remain free of falls  INTERVENTIONS:  - Assess patient frequently for physical needs  -  Identify cognitive and physical deficits and behaviors that affect risk of falls  -  [place] fall precautions as indicated by assessment  High fall risk   - Educate patient/family on patient safety including physical limitations  - Instruct patient to call for assistance with activity based on assessment  - Modify environment to reduce risk of injury  - Consider OT/PT consult to assist with strengthening/mobility   Outcome: Progressing      Problem: Nutrition/Hydration-ADULT  Goal: Nutrient/Hydration intake appropriate for improving, restoring or maintaining nutritional needs  Monitor and assess patient's nutrition/hydration status for malnutrition (ex- brittle hair, bruises, dry skin, pale skin and conjunctiva, muscle wasting, smooth red tongue, and disorientation)  Collaborate with interdisciplinary team and initiate plan and interventions as ordered  Monitor patient's weight and dietary intake as ordered or per policy  Utilize nutrition screening tool and intervene per policy  Determine patient's food preferences and provide high-protein, high-caloric foods as appropriate       INTERVENTIONS:  - Monitor oral intake, urinary output, labs, and treatment plans  - Assess nutrition and hydration status and recommend course of action  - Evaluate amount of meals eaten  - Assist patient with eating if necessary   - Allow adequate time for meals  - Recommend/ encourage appropriate diets, oral nutritional supplements, and vitamin/mineral supplements  - Order, calculate, and assess calorie counts as needed  - Recommend, monitor, and adjust tube feedings and TPN/PPN based on assessed needs  - Assess need for intravenous fluids  - Provide specific nutrition/hydration education as appropriate  - Include patient/family/caregiver in decisions related to nutrition   Outcome: Progressing      Problem: HEMATOLOGIC - ADULT  Goal: Maintains hematologic stability  INTERVENTIONS  - Assess for signs and symptoms of bleeding or hemorrhage  - Monitor labs  - Administer supportive blood products/factors as ordered and appropriate  Outcome: Progressing      Problem: PAIN - ADULT  Goal: Verbalizes/displays adequate comfort level or baseline comfort level  Interventions:  - Encourage patient to monitor pain and request assistance  - Assess pain using appropriate pain scale  - Administer analgesics based on type and severity of pain and evaluate response  - Implement non-pharmacological measures as appropriate and evaluate response  - Consider cultural and social influences on pain and pain management  - Notify physician/advanced practitioner if interventions unsuccessful or patient reports new pain  Outcome: Progressing      Problem: INFECTION - ADULT  Goal: Absence or prevention of progression during hospitalization  INTERVENTIONS:  - Assess and monitor for signs and symptoms of infection  - Monitor lab/diagnostic results  - Monitor all insertion sites, i e  indwelling lines, tubes, and drains  - Administer medications as ordered  - Instruct and encourage patient and family to use good hand hygiene technique  - Identify and instruct in appropriate isolation precautions for identified infection/condition  Outcome: Progressing    Goal: Absence of fever/infection during neutropenic period  INTERVENTIONS:  - Monitor WBC    Outcome: Progressing      Problem: SAFETY ADULT  Goal: Maintain or return to baseline ADL function  INTERVENTIONS:  -  Assess patient's ability to carry out ADLs; assess patient's baseline for ADL function and identify physical deficits which impact ability to perform ADLs (bathing, care of mouth/teeth, toileting, grooming, dressing, etc )  - Assess/evaluate cause of self-care deficits   - Assess range of motion  - Assess patient's mobility; develop plan if impaired  - Assess patient's need for assistive devices and provide as appropriate  - Encourage maximum independence but intervene and supervise when necessary  ¯ Involve family in performance of ADLs  ¯ Assess for home care needs following discharge   ¯ Request OT consult to assist with ADL evaluation and planning for discharge  ¯ Provide patient education as appropriate  Outcome: Progressing    Goal: Maintain or return mobility status to optimal level  INTERVENTIONS:  - Assess patient's baseline mobility status (ambulation, transfers, stairs, etc )    - Identify cognitive and physical deficits and behaviors that affect mobility  - Identify mobility aids required to assist with transfers and/or ambulation (gait belt, sit-to-stand, lift, walker, cane, etc )  - [place] fall precautions as indicated by assessment  - Record patient progress and toleration of activity level on Mobility SBAR; progress patient to next Phase/Stage  - Instruct patient to call for assistance with activity based on assessment  - Request Rehabilitation consult to assist with strengthening/weightbearing, etc   Outcome: Progressing      Problem: DISCHARGE PLANNING  Goal: Discharge to home or other facility with appropriate resources  INTERVENTIONS:  - Identify barriers to discharge w/patient and caregiver  - Arrange for needed discharge resources and transportation as appropriate  - Identify discharge learning needs (meds, wound care, etc )  - Arrange for interpretive services to assist at discharge as needed  - Refer to Case Management Department for coordinating discharge planning if the patient needs post-hospital services based on physician/advanced practitioner order or complex needs related to functional status, cognitive ability, or social support system  Outcome: Progressing      Problem: Knowledge Deficit  Goal: Patient/family/caregiver demonstrates understanding of disease process, treatment plan, medications, and discharge instructions  Complete learning assessment and assess knowledge base    Interventions:  - Provide teaching at level of understanding  - Provide teaching via preferred learning methods  Outcome: Progressing

## 2018-07-19 NOTE — PROGRESS NOTES
Unable to assess left breast wound or take measurements upon admission due to extensive bleeding while pt in ED per RN report  Discussed with Dr Marine Fowler  Will keep dressing intact and reinforce as needed  Wound consult order received  Will continue to monitor

## 2018-07-19 NOTE — ASSESSMENT & PLAN NOTE
-consult Hematology/Oncology  -evidence of metastatic disease was discussed at length today with the patient at bedside, she was just recently diagnosed with breast cancer, she was not aware of her metastatic disease, patient has very poor social support, patient continues to display poor insight into the seriousness of her current illness  Patient was offered transfer to higher level of care for Hematology-Oncology evaluation, possible vascular surgery evaluation, patient declines transfer at this time

## 2018-07-19 NOTE — ASSESSMENT & PLAN NOTE
-possible sepsis present on admission secondary to possible post-obstructive pneumonia  Follow-up cultures       -check and follow the procalcitonin level  -continue broad-spectrum antibiotics IV cefepime and IV vancomycin for now  -consult Pulmonology in the setting of a pleural effusion and possible pneumonia

## 2018-07-19 NOTE — ED NOTES
Patient has large cancerous mass to left breast, upon receiving patient wound was dressed by Dr Tyler Corrales  Verbal orders to not undress wound due to large amount of bleeding, charge nurse and supervisor aware of wound  See Dr Juan Antonio Anderson note for wound assessment        Benjamin Cordova RN  07/18/18 2394

## 2018-07-19 NOTE — ASSESSMENT & PLAN NOTE
-hold anticoagulation for now in the setting of acute blood loss anemia  -we will need to discuss the case with Vascular Surgery and Interventional Radiology to see if there is any other alternative treatment of this extensive deep vein thrombosis of the left upper extremity  -consult Hematology/Oncology  -for anticoagulation choices, Lovenox is the treatment of choice in the setting of an active malignancy

## 2018-07-19 NOTE — CASE MANAGEMENT
Initial Clinical Review    Admission: Date/Time/Statement: 7/18/18 @ 1903     Orders Placed This Encounter   Procedures    Inpatient Admission (expected length of stay for this patient is greater than two midnights)     Standing Status:   Standing     Number of Occurrences:   1     Order Specific Question:   Admitting Physician     Answer:   Smiley Gutierrez     Order Specific Question:   Level of Care     Answer:   Med Surg [16]     Order Specific Question:   Estimated length of stay     Answer:   More than 2 Midnights     Order Specific Question:   Certification     Answer:   I certify that inpatient services are medically necessary for this patient for a duration of greater than two midnights  See H&P and MD Progress Notes for additional information about the patient's course of treatment  ED: Date/Time/Mode of Arrival:   ED Arrival Information     Expected Arrival Acuity Means of Arrival Escorted By Service Admission Type    - 7/18/2018 14:10 Emergent Wheelchair Family Member General Medicine Emergency    Arrival Complaint    [facility]          Chief Complaint:   Chief Complaint   Patient presents with    Arm Swelling     pt was seen at [facility] ER on monday  was told she has a blood clot in her left arm, suspicious of PE  was started on lovenox  signed out because [facility] was too far  called PCP they told her she can come here  pt recently told she has CA  had mamogram where "they drained something"  left breast actively oozing blood with large hole  pt c/o dizziness at this time  History of Illness: A 55 y/o female  presents with bleeding from a fungating left breast mass associated with starting Lovenox 2 days ago for left upper extremity DVT identified on ultrasound at [facility] 2 days ago  The swelling started 5 days ago  She currently complains of pain and persistent venous oozing from the breast mass      ED Vital Signs:   ED Triage Vitals [07/18/18 1424] Temperature Pulse Respirations Blood Pressure SpO2   97 6 °F (36 4 °C) (!) 113 22 134/63 100 %      Temp Source Heart Rate Source Patient Position - Orthostatic VS BP Location FiO2 (%)   Temporal Monitor Lying Right arm --      Pain Score       Worst Possible Pain        Wt Readings from Last 1 Encounters:   07/18/18 64 1 kg (141 lb 5 oz)       Vital Signs (abnormal): Highest     Abnormal Labs/Diagnostic Test Results: WBC 24 11, H/H 6 1/20 5, Platelets 828, Na 876, Cl 99, Glucose 147, PT/INR 15 9/1 34    CTA Chest/Abd/Pelvis: Irregular highly aggressive appearing left breast mass/neoplasm infiltrating into the left pleura and causing destruction of the left 4th and 5th ribs   Left axillary metastatic lymphadenopathy  Multifocal lung masses identified could represent metastatic disease and/or pneumonia        Moderate left pleural effusion   Left basilar consolidation representing atelectasis and/or pneumonia  Bilateral hilar lymphadenopathy/metastatic disease  No evidence of filling defect in the pulmonary trunk or main pulmonary arteries   Right lower lobe mass appears to infiltrate the right pulmonary arteries in the right lower lobe   Left hilar mass infiltrates and causes constriction/stenosis of the left   lobe are pulmonary arteries  Right gluteal centrally necrotic masses/metastatic disease  Nonspecific 1 2 cm nodule identified posterior to the left gluteus kellee muscle  Lucent lesion to the inferior anterior aspect of the L2 vertebral body could relate to metastatic disease      ED Treatment:   Medication Administration from 07/18/2018 1409 to 07/18/2018 2052       Date/Time Order Dose Route Action Action by Comments     07/18/2018 1537 sodium chloride 0 9 % bolus 1,000 mL 0 mL Intravenous Stopped Rylee Winnie Bachelor, NAVJOT      07/18/2018 1437 sodium chloride 0 9 % bolus 1,000 mL 1,000 mL Intravenous New Bag Brenda Munroe RN      07/18/2018 1443 fentanyl citrate (PF) 100 MCG/2ML 50 mcg 50 mcg Intravenous Given Marek Cuellar RN      07/18/2018 1716 LORazepam (ATIVAN) 2 mg/mL injection 0 5 mg 0 5 mg Intravenous Given Rylee Alfornia Finder, RN      07/18/2018 1744 iohexol (OMNIPAQUE) 350 MG/ML injection (SINGLE-DOSE) 100 mL 100 mL Intravenous Given Bernerd Sniff         Physical Exam  Constitutional: She is oriented to person, place, and time  She appears well-developed and well-nourished  She appears distressed (Moderately)  Moderate conjunctival pallor   Cardiovascular: Normal rate  No murmur heard  Tachycardic   Pulmonary/Chest: Effort normal and breath sounds normal  She exhibits tenderness (In the area of a large fungating left breast mass measuring approximately 12 cm in diameter that is cavitary is actively oozing dark red blood)  Mildly tachypneic   Skin: Skin is warm and dry  Capillary refill takes 2 to 3 seconds     Past Medical/Surgical History:    Active Ambulatory Problems     Diagnosis Date Noted    Cardiac murmur 03/20/2018    History of mitral valve disorder 03/20/2018    Breast wound, left, sequela 03/20/2018    Vitamin B 12 deficiency 03/20/2018    Vitamin D deficiency 03/20/2018    Essential hypertension 03/20/2018    Chronic seasonal allergic rhinitis 03/20/2018    Gastroesophageal reflux disease 03/20/2018    Chronic bilateral low back pain without sciatica 03/20/2018    Mixed hyperlipidemia 03/20/2018    Other insomnia 06/26/2018     Resolved Ambulatory Problems     Diagnosis Date Noted    No Resolved Ambulatory Problems     Past Medical History:   Diagnosis Date    Arm fracture, left     Blood clot in vein     Breast cancer, stage 3 (HCC)     Bronchitis     Chronic tonsillitis     History of abnormal mammogram 11/2017    History of Papanicolaou smear of cervix 06/2017    Hyperlipidemia     Hypertension     Left sided sciatica     Meningitis     Mitral regurgitation     Muscle strain of left lower leg        Admitting Diagnosis: Arm pain [M79 603]  Anemia [D64 9]  Arm swelling [M79 89]  Pleural effusion on left [J90]  Metastatic breast cancer (HCC) [C50 919]  Large mass of left breast [N63 20]    Age/Sex: 56 y o  female    Assessment/Plan:     * Acute blood loss anemia   Assessment & Plan     -admit to med/surg level of care  -the acute blood loss anemia secondary to bleeding from her abdominal wall after a Lovenox injection as well as bleeding from her left breast wound  -transfuse 2 units of packed red blood cells at this time  -follow the hemoglobin/hematocrit levels  -hold full anticoagulation for now          Sepsis ([address] )   Assessment & Plan     -possible sepsis present on admission secondary to possible post-obstructive pneumonia  -check blood cultures x 2 sets  -check a urine culture  -check a lactic acid level  -check and follow the procalcitonin level  -normal saline IV fluids  -initiate broad-spectrum antibiotics IV cefepime and IV vancomycin for now  -consult Pulmonology in the setting of a pleural effusion          Metastatic breast cancer [facility])   Assessment & Plan     -consult Hematology/Oncology  -the patient is scheduled for an outpatient appointment in [address] [place] [address] with Hematology/Oncology  -the patient was not aware of the severity of her disease          Acute deep vein thrombosis (DVT) of left upper extremity (HCC)   Assessment & Plan     -hold anticoagulation for now in the setting of acute blood loss anemia  -we will need to discuss the case with Vascular Surgery and Interventional Radiology to see if there is any other alternative treatment of this extensive deep vein thrombosis of the left upper extremity  -consult Hematology/Oncology  -for anticoagulation choices, Lovenox is the treatment of choice in the setting of an active malignancy  -restart anticoagulation on 07/19/2018 if her hemoglobin/hematocrit levels are stable          Elevated platelet count   Assessment & Plan     -reactive in the setting of acute blood loss anemia, an active malignancy, and possible sepsis  -follow the CBC          Hyperglycemia   Assessment & Plan     -check a HgbA1c level  -outpatient follow-up with her PCP in regards to this matter          Hypercholesterolemia   Assessment & Plan     -continue statin therapy          Essential hypertension   Assessment & Plan     -continue p o  lisinopril  -follow her blood pressure trend          Vitamin D deficiency   Assessment & Plan     -continue vitamin D supplementation  -outpatient follow-up with her PCP in regards to this matter          Vitamin B 12 deficiency   Assessment & Plan     -continue cyanocobalamin supplementation  -outpatient follow-up with her PCP in regards to this matter          Breast wound, left, sequela   Assessment & Plan     -consult the wound care team  -outpatient follow-up with Surgical Oncology                   VTE Prophylaxis: Pharmacologic VTE Prophylaxis contraindicated due to acute blood loss anemia and active hemorrhage  / sequential compression device   Code Status:  Level 1-Full Code     Anticipated Length of Stay:  Patient will be admitted on an Inpatient basis with an anticipated length of stay of greater than 2 midnights  Justification for Hospital Stay:  The patient requires a packed red blood cell transfusion x 2 units, IV antibiotics, IV fluids, and serial laboratory testing to monitor her hemoglobin/hematocrit levels         Admission Orders:  Inpatient/Tele  Continuous Cardiac Monitoring  Consult Surgery r/e left breast mass  Consult Hematology r/e metastatic breast cancer  Consult Pulmonary r/e sepsis  Consult Wound Care r/e left breast wound   Bilateral Sequential Compression Device  OT/PT Consult  Transfusion 2 units PRBCs  H&H q8h    Scheduled Meds:   Current Facility-Administered Medications:  aspirin 81 mg Oral Daily   atorvastatin 40 mg Oral Daily With Dinner   calcium carbonate-vitamin D 1 tablet Oral BID With Meals   cefepime 2,000 mg Intravenous Q12H cyanocobalamin 500 mcg Oral Daily   famotidine 20 mg Oral BID   fluticasone 1 spray Each Nare Daily   lisinopril 30 mg Oral Daily   loratadine 10 mg Oral Daily   melatonin 3 mg Oral HS   multivitamin-minerals 1 tablet Oral Daily   IVPB builder 1,000 mg Intravenous Q12H     IV Morphine 2 mg x 2 thus far  Oxycodone 5 mg po x 1 thus far

## 2018-07-19 NOTE — SOCIAL WORK
Cm met with the patient to evaluate the patients prior function and living situation and any barriers to d/c and form a safe d/c plan  Cm also evaluated the patient for any services in the home or needs for services  Pt resides at home with her s/o in a house  Has 4 JAREN then 14 to her 2nd floor bedroom/bathroom  Pt had been independent with her adls and ambulation  No services or DME  Pt independent with driving  PCP is Kori Rao and pharmacy:uses [facility] in [place]  Plans at this time are home on dc with outpatient follow up  Cm will continue to follow and assist in dc planning

## 2018-07-19 NOTE — ASSESSMENT & PLAN NOTE
-admit to med/surg level of care  -the acute blood loss anemia secondary to bleeding from her abdominal wall after a Lovenox injection as well as bleeding from her left breast wound, patient reports significant blood loss from left breast wound  -patient received 2 units packed red blood cells, monitor H&H every 8 hours  Hold anticoagulation until we ensure that hemoglobin is stable  Plan to discuss possible vena cava filter with IR

## 2018-07-19 NOTE — ASSESSMENT & PLAN NOTE
-possible sepsis present on admission secondary to possible post-obstructive pneumonia  -check blood cultures x 2 sets  -check a urine culture  -check a lactic acid level  -check and follow the procalcitonin level  -normal saline IV fluids  -initiate broad-spectrum antibiotics IV cefepime and IV vancomycin for now  -consult Pulmonology in the setting of a pleural effusion

## 2018-07-19 NOTE — MALNUTRITION/BMI
This medical record reflects one or more clinical indicators suggestive of malnutrition  Malnutrition Findings:   Malnutrition type: Acute illness  Degree of Malnutrition: Other severe protein calorie malnutrition  Malnutrition Characteristics: Fat loss, Muscle loss, Inadequate energy (Severe PCM r/t prolong inadequate po intake suspect <75% energy needs for several months with muscle fat wasting as evident by temporal and facial wasting and protruding clavicle  Treatment diet as ordered  Patient refuses supplements )    BMI Findings: Body mass index is 25 85 kg/m²  See Nutrition note dated 7/19/2018 for additional details  Completed nutrition assessment is viewable in the nutrition documentation

## 2018-07-19 NOTE — TREATMENT PLAN
Left upper extremity DVT was discussed with interventional radiologist, this has a very low likelihood, likely less than 1% chance of thrombosis/developing into pulmonary embolism, there is no role for any localized lytic therapy in the setting of malignancy  Risk of anticoagulation far outweighs any benefit and it will not be restarted

## 2018-07-19 NOTE — PROGRESS NOTES
Progress Note Ye Bill [birth year], 56 y o  female MRN: [id number]    Unit/Bed#: 403-01 Encounter: [id number]    Primary Care Provider: CLINTON Magallon   Date and time admitted to hospital: 7/18/2018  2:13 PM        * Acute blood loss anemia   Assessment & Plan    -admit to med/surg level of care  -the acute blood loss anemia secondary to bleeding from her abdominal wall after a Lovenox injection as well as bleeding from her left breast wound, patient reports significant blood loss from left breast wound  -patient received 2 units packed red blood cells, monitor H&H every 8 hours  Hold anticoagulation until we ensure that hemoglobin is stable  Plan to discuss possible vena cava filter with IR          Acute deep vein thrombosis (DVT) of left upper extremity (HCC)   Assessment & Plan    -hold anticoagulation for now in the setting of acute blood loss anemia  -we will need to discuss the case with Vascular Surgery and Interventional Radiology to see if there is any other alternative treatment of this extensive deep vein thrombosis of the left upper extremity  -consult Hematology/Oncology  -for anticoagulation choices, Lovenox is the treatment of choice in the setting of an active malignancy          Sepsis ([facility] [address] )   Assessment & Plan    -possible sepsis present on admission secondary to possible post-obstructive pneumonia  Follow-up cultures       -check and follow the procalcitonin level  -continue broad-spectrum antibiotics IV cefepime and IV vancomycin for now  -consult Pulmonology in the setting of a pleural effusion and possible pneumonia        Metastatic breast cancer [facility])   Assessment & Plan    -consult Hematology/Oncology  -evidence of metastatic disease was discussed at length today with the patient at bedside, she was just recently diagnosed with breast cancer, she was not aware of her metastatic disease, patient has very poor social support, patient continues to display poor insight into the seriousness of her current illness  Patient was offered transfer to higher level of care for Hematology-Oncology evaluation, possible vascular surgery evaluation, patient declines transfer at this time  Type 2 myocardial infarction without ST elevation [facility])   Assessment & Plan    Likely secondary to severe anemia  Essential hypertension   Assessment & Plan    -continue p o  lisinopril  -follow her blood pressure trend        Breast wound, left, sequela   Assessment & Plan    -consult the wound care team  -consult General surgical team         Hyperglycemia   Assessment & Plan    -check a HgbA1c level  -outpatient follow-up with her PCP in regards to this matter          VTE Pharmacologic Prophylaxis:   Pharmacologic: Pharmacologic VTE Prophylaxis contraindicated due to Acute blood loss anemia  Mechanical VTE Prophylaxis in Place: Yes    Patient Centered Rounds: I have performed bedside rounds with nursing staff today  Discussions with Specialists or Other Care Team Provider:   Plan of care discussed with admitting team     Time Spent for Care: 1 hour  More than 50% of total time spent on counseling and coordination of care as described above  Current Length of Stay: 1 day(s)    Current Patient Status: Inpatient   Certification Statement: The patient will continue to require additional inpatient hospital stay due to IV antibiotics, further monitoring of hemoglobin level as well as treatment of a left upper extremity DVT    Discharge Plan / Estimated Discharge Date:   2018      Code Status: Level 1 - Full Code      Subjective:   Minimal left arm pain, poor p   O  intake but otherwise has no acute complaints, denies any other pain, no nausea no vomiting      Objective:     Vitals:   Temp (24hrs), Av 8 °F (37 1 °C), Min:97 6 °F (36 4 °C), Max:99 6 °F (37 6 °C)    HR:  [] 88  Resp:  [16-22] 18  BP: (124-150)/(60-75) 124/65  SpO2:  [96 %-100 %] 96 %  Body mass index is 25 85 kg/m²  Input and Output Summary (last 24 hours): Intake/Output Summary (Last 24 hours) at 07/19/18 1100  Last data filed at 07/19/18 1003   Gross per 24 hour   Intake           4460 2 ml   Output              700 ml   Net           3760 2 ml       Physical Exam:     Physical Exam   Constitutional: She is oriented to person, place, and time  She appears well-developed and well-nourished  No distress  HENT:   Head: Normocephalic and atraumatic  Mouth/Throat: Oropharynx is clear and moist  No oropharyngeal exudate  Cardiovascular: Normal rate  Pulmonary/Chest: Effort normal and breath sounds normal  No respiratory distress  She has no wheezes  Abdominal: Soft  Bowel sounds are normal  She exhibits no distension  There is no tenderness  Musculoskeletal: She exhibits edema (Left upper extremity with significant edema)  A large necrotic left breast wound, with serosanguineous drainage   Neurological: She is alert and oriented to person, place, and time  Skin: Skin is warm and dry  She is not diaphoretic  Psychiatric:   Patient tearful at times during exam, appropriately so given severity of illness, patient with poor insight into requirements for treatment follow-through  Nursing note and vitals reviewed  Additional Data:     Labs:      Results from last 7 days  Lab Units 07/19/18  0534   WBC Thousand/uL 16 22*   HEMOGLOBIN g/dL 8 1*   HEMATOCRIT % 25 1*   PLATELETS Thousands/uL 475*   LYMPHO PCT % 16   MONO PCT MAN % 5   EOSINO PCT MANUAL % 0       Results from last 7 days  Lab Units 07/19/18  0534   SODIUM mmol/L 134*   POTASSIUM mmol/L 3 8   CHLORIDE mmol/L 103   CO2 mmol/L 23   BUN mg/dL 10   CREATININE mg/dL 0 64   CALCIUM mg/dL 8 6   TOTAL PROTEIN g/dL 5 5*   BILIRUBIN TOTAL mg/dL 0 80   ALK PHOS U/L 58   ALT U/L 13   AST U/L 14   GLUCOSE RANDOM mg/dL 107       Results from last 7 days  Lab Units 07/18/18  1439   INR  1 34*       * I Have Reviewed All Lab Data Listed Above    * Additional Pertinent Lab Tests Reviewed:  [address] Admission Reviewed      Recent Cultures (last 7 days):       Results from last 7 days  Lab Units 07/19/18  0214   LEGIONELLA URINARY ANTIGEN  Negative       Last 24 Hours Medication List:     Current Facility-Administered Medications:  acetaminophen 650 mg Oral Q6H PRN Rannie Peoples, DO    aspirin 81 mg Oral Daily Rannie Peoples, DO    atorvastatin 40 mg Oral Daily With Weaver & Hugh, DO    calcium carbonate-vitamin D 1 tablet Oral BID With Meals Rannie Peoples, DO    cefepime 2,000 mg Intravenous Q12H Ranluthere Dominiks, DO Last Rate: 2,000 mg (07/18/18 7223)   cyanocobalamin 500 mcg Oral Daily Rannie Peoples, DO    famotidine 20 mg Oral BID Rannie Peoples, DO    fluticasone 1 spray Each Nare Daily Rannie Peoples, DO    lisinopril 30 mg Oral Daily Rannie Peoples, DO    loratadine 10 mg Oral Daily Rannie Peoples, DO    melatonin 3 mg Oral HS Rannie Peoples, DO    morphine injection 2 mg Intravenous Q3H PRN Rannie Peoples, DO    multivitamin-minerals 1 tablet Oral Daily Rannie Peoples, DO    ondansetron 4 mg Intravenous Q6H PRN Rannie Peoples, DO    oxyCODONE 5 mg Oral Q4H PRN Rannie Peoples, DO    IVPB builder 1,000 mg Intravenous Q12H Gabino Patterson DO Last Rate: 1,000 mg (07/19/18 1049)        Today, Patient Was Seen By: Gabino Patterson DO

## 2018-07-19 NOTE — ASSESSMENT & PLAN NOTE
-reactive in the setting of acute blood loss anemia, an active malignancy, and possible sepsis  -follow the CBC

## 2018-07-20 LAB
ALBUMIN SERPL BCP-MCNC: 2 G/DL (ref 3.5–5)
ALP SERPL-CCNC: 54 U/L (ref 46–116)
ALT SERPL W P-5'-P-CCNC: 13 U/L (ref 12–78)
ANION GAP SERPL CALCULATED.3IONS-SCNC: 6 MMOL/L (ref 4–13)
ANISOCYTOSIS BLD QL SMEAR: PRESENT
AST SERPL W P-5'-P-CCNC: 10 U/L (ref 5–45)
BACTERIA UR CULT: NORMAL
BASOPHILS # BLD MANUAL: 0 THOUSAND/UL (ref 0–0.1)
BASOPHILS NFR MAR MANUAL: 0 % (ref 0–1)
BILIRUB SERPL-MCNC: 0.4 MG/DL (ref 0.2–1)
BUN SERPL-MCNC: 11 MG/DL (ref 5–25)
CALCIUM SERPL-MCNC: 8.1 MG/DL (ref 8.3–10.1)
CHLORIDE SERPL-SCNC: 104 MMOL/L (ref 100–108)
CO2 SERPL-SCNC: 25 MMOL/L (ref 21–32)
CREAT SERPL-MCNC: 0.73 MG/DL (ref 0.6–1.3)
EOSINOPHIL # BLD MANUAL: 0 THOUSAND/UL (ref 0–0.4)
EOSINOPHIL NFR BLD MANUAL: 0 % (ref 0–6)
ERYTHROCYTE [DISTWIDTH] IN BLOOD BY AUTOMATED COUNT: 17.6 % (ref 11.6–15.1)
GFR SERPL CREATININE-BSD FRML MDRD: 93 ML/MIN/1.73SQ M
GLUCOSE SERPL-MCNC: 130 MG/DL (ref 65–140)
HCT VFR BLD AUTO: 22.1 % (ref 34.8–46.1)
HCT VFR BLD AUTO: 23.1 % (ref 34.8–46.1)
HCT VFR BLD AUTO: 25.1 % (ref 34.8–46.1)
HCT VFR BLD AUTO: 25.7 % (ref 34.8–46.1)
HGB BLD-MCNC: 6.9 G/DL (ref 11.5–15.4)
HGB BLD-MCNC: 7.3 G/DL (ref 11.5–15.4)
HGB BLD-MCNC: 8.1 G/DL (ref 11.5–15.4)
HGB BLD-MCNC: 8.3 G/DL (ref 11.5–15.4)
HYPERCHROMIA BLD QL SMEAR: PRESENT
LG PLATELETS BLD QL SMEAR: PRESENT
LYMPHOCYTES # BLD AUTO: 1.72 THOUSAND/UL (ref 0.6–4.47)
LYMPHOCYTES # BLD AUTO: 9 % (ref 14–44)
MAGNESIUM SERPL-MCNC: 1.8 MG/DL (ref 1.6–2.6)
MCH RBC QN AUTO: 24.6 PG (ref 26.8–34.3)
MCHC RBC AUTO-ENTMCNC: 31.6 G/DL (ref 31.4–37.4)
MCV RBC AUTO: 78 FL (ref 82–98)
MONOCYTES # BLD AUTO: 0.96 THOUSAND/UL (ref 0–1.22)
MONOCYTES NFR BLD: 5 % (ref 4–12)
MRSA NOSE QL CULT: NORMAL
NEUTROPHILS # BLD MANUAL: 16.45 THOUSAND/UL (ref 1.85–7.62)
NEUTS BAND NFR BLD MANUAL: 3 % (ref 0–8)
NEUTS SEG NFR BLD AUTO: 83 % (ref 43–75)
OVALOCYTES BLD QL SMEAR: PRESENT
PLATELET # BLD AUTO: 463 THOUSANDS/UL (ref 149–390)
PLATELET BLD QL SMEAR: ABNORMAL
PMV BLD AUTO: 9.2 FL (ref 8.9–12.7)
POTASSIUM SERPL-SCNC: 3.9 MMOL/L (ref 3.5–5.3)
PROT SERPL-MCNC: 5.1 G/DL (ref 6.4–8.2)
RBC # BLD AUTO: 2.97 MILLION/UL (ref 3.81–5.12)
SODIUM SERPL-SCNC: 135 MMOL/L (ref 136–145)
TOTAL CELLS COUNTED SPEC: 100
TROPONIN I SERPL-MCNC: 0.09 NG/ML
VANCOMYCIN TROUGH SERPL-MCNC: 10.4 UG/ML (ref 10–20)
WBC # BLD AUTO: 19.13 THOUSAND/UL (ref 4.31–10.16)

## 2018-07-20 PROCEDURE — 85007 BL SMEAR W/DIFF WBC COUNT: CPT | Performed by: INTERNAL MEDICINE

## 2018-07-20 PROCEDURE — P9021 RED BLOOD CELLS UNIT: HCPCS

## 2018-07-20 PROCEDURE — 99222 1ST HOSP IP/OBS MODERATE 55: CPT | Performed by: INTERNAL MEDICINE

## 2018-07-20 PROCEDURE — 83735 ASSAY OF MAGNESIUM: CPT | Performed by: INTERNAL MEDICINE

## 2018-07-20 PROCEDURE — 85018 HEMOGLOBIN: CPT | Performed by: PHYSICIAN ASSISTANT

## 2018-07-20 PROCEDURE — 85014 HEMATOCRIT: CPT | Performed by: PHYSICIAN ASSISTANT

## 2018-07-20 PROCEDURE — 85014 HEMATOCRIT: CPT | Performed by: INTERNAL MEDICINE

## 2018-07-20 PROCEDURE — 80053 COMPREHEN METABOLIC PANEL: CPT | Performed by: INTERNAL MEDICINE

## 2018-07-20 PROCEDURE — 97116 GAIT TRAINING THERAPY: CPT | Performed by: PHYSICAL THERAPIST

## 2018-07-20 PROCEDURE — 84484 ASSAY OF TROPONIN QUANT: CPT | Performed by: INTERNAL MEDICINE

## 2018-07-20 PROCEDURE — 85027 COMPLETE CBC AUTOMATED: CPT | Performed by: INTERNAL MEDICINE

## 2018-07-20 PROCEDURE — 80202 ASSAY OF VANCOMYCIN: CPT | Performed by: INTERNAL MEDICINE

## 2018-07-20 PROCEDURE — 85018 HEMOGLOBIN: CPT | Performed by: INTERNAL MEDICINE

## 2018-07-20 PROCEDURE — 99233 SBSQ HOSP IP/OBS HIGH 50: CPT | Performed by: INTERNAL MEDICINE

## 2018-07-20 RX ORDER — ACETAMINOPHEN 325 MG/1
650 TABLET ORAL ONCE
Status: COMPLETED | OUTPATIENT
Start: 2018-07-20 | End: 2018-07-20

## 2018-07-20 RX ORDER — DIPHENHYDRAMINE HCL 25 MG
25 TABLET ORAL ONCE
Status: COMPLETED | OUTPATIENT
Start: 2018-07-20 | End: 2018-07-20

## 2018-07-20 RX ADMIN — ACETAMINOPHEN 650 MG: 325 TABLET, FILM COATED ORAL at 19:54

## 2018-07-20 RX ADMIN — CALCIUM CARBONATE-VITAMIN D TAB 500 MG-200 UNIT 1 TABLET: 500-200 TAB at 17:12

## 2018-07-20 RX ADMIN — LORATADINE 10 MG: 10 TABLET ORAL at 08:43

## 2018-07-20 RX ADMIN — OXYCODONE HYDROCHLORIDE 5 MG: 5 TABLET ORAL at 08:53

## 2018-07-20 RX ADMIN — VANCOMYCIN HYDROCHLORIDE 1250 MG: 500 INJECTION, POWDER, LYOPHILIZED, FOR SOLUTION INTRAVENOUS at 12:45

## 2018-07-20 RX ADMIN — MULTIPLE VITAMINS W/ MINERALS TAB 1 TABLET: TAB at 08:43

## 2018-07-20 RX ADMIN — CALCIUM CARBONATE-VITAMIN D TAB 500 MG-200 UNIT 1 TABLET: 500-200 TAB at 08:43

## 2018-07-20 RX ADMIN — ATORVASTATIN CALCIUM 40 MG: 40 TABLET, FILM COATED ORAL at 17:12

## 2018-07-20 RX ADMIN — CYANOCOBALAMIN TAB 500 MCG 500 MCG: 500 TAB at 08:43

## 2018-07-20 RX ADMIN — MELATONIN TAB 3 MG 3 MG: 3 TAB at 21:00

## 2018-07-20 RX ADMIN — ASPIRIN 81 MG 81 MG: 81 TABLET ORAL at 08:43

## 2018-07-20 RX ADMIN — FLUTICASONE PROPIONATE 1 SPRAY: 50 SPRAY, METERED NASAL at 08:44

## 2018-07-20 RX ADMIN — MORPHINE SULFATE 2 MG: 2 INJECTION, SOLUTION INTRAMUSCULAR; INTRAVENOUS at 01:10

## 2018-07-20 RX ADMIN — FAMOTIDINE 20 MG: 20 TABLET ORAL at 17:12

## 2018-07-20 RX ADMIN — OXYCODONE HYDROCHLORIDE 5 MG: 5 TABLET ORAL at 17:14

## 2018-07-20 RX ADMIN — CEFEPIME HYDROCHLORIDE 2000 MG: 2 INJECTION, SOLUTION INTRAVENOUS at 11:56

## 2018-07-20 RX ADMIN — HYDROMORPHONE HYDROCHLORIDE 1 MG: 1 INJECTION, SOLUTION INTRAMUSCULAR; INTRAVENOUS; SUBCUTANEOUS at 01:36

## 2018-07-20 RX ADMIN — DIPHENHYDRAMINE HCL 25 MG: 25 TABLET ORAL at 19:55

## 2018-07-20 RX ADMIN — FAMOTIDINE 20 MG: 20 TABLET ORAL at 08:43

## 2018-07-20 NOTE — PLAN OF CARE
Problem: Potential for Falls  Goal: Patient will remain free of falls  INTERVENTIONS:  - Assess patient frequently for physical needs  -  Identify cognitive and physical deficits and behaviors that affect risk of falls  -  [place] fall precautions as indicated by assessment  High fall risk   - Educate patient/family on patient safety including physical limitations  - Instruct patient to call for assistance with activity based on assessment  - Modify environment to reduce risk of injury  - Consider OT/PT consult to assist with strengthening/mobility    Outcome: Progressing      Problem: Nutrition/Hydration-ADULT  Goal: Nutrient/Hydration intake appropriate for improving, restoring or maintaining nutritional needs  Monitor and assess patient's nutrition/hydration status for malnutrition (ex- brittle hair, bruises, dry skin, pale skin and conjunctiva, muscle wasting, smooth red tongue, and disorientation)  Collaborate with interdisciplinary team and initiate plan and interventions as ordered  Monitor patient's weight and dietary intake as ordered or per policy  Utilize nutrition screening tool and intervene per policy  Determine patient's food preferences and provide high-protein, high-caloric foods as appropriate       INTERVENTIONS:  - Monitor oral intake, urinary output, labs, and treatment plans  - Assess nutrition and hydration status and recommend course of action  - Evaluate amount of meals eaten  - Assist patient with eating if necessary   - Allow adequate time for meals  - Recommend/ encourage appropriate diets, oral nutritional supplements, and vitamin/mineral supplements  - Order, calculate, and assess calorie counts as needed  - Recommend, monitor, and adjust tube feedings and TPN/PPN based on assessed needs  - Assess need for intravenous fluids  - Provide specific nutrition/hydration education as appropriate  - Include patient/family/caregiver in decisions related to nutrition   Outcome: Progressing      Problem: HEMATOLOGIC - ADULT  Goal: Maintains hematologic stability  INTERVENTIONS  - Assess for signs and symptoms of bleeding or hemorrhage  - Monitor labs  - Administer supportive blood products/factors as ordered and appropriate   Outcome: Progressing      Problem: PAIN - ADULT  Goal: Verbalizes/displays adequate comfort level or baseline comfort level  Interventions:  - Encourage patient to monitor pain and request assistance  - Assess pain using appropriate pain scale  - Administer analgesics based on type and severity of pain and evaluate response  - Implement non-pharmacological measures as appropriate and evaluate response  - Consider cultural and social influences on pain and pain management  - Notify physician/advanced practitioner if interventions unsuccessful or patient reports new pain   Outcome: Progressing      Problem: INFECTION - ADULT  Goal: Absence or prevention of progression during hospitalization  INTERVENTIONS:  - Assess and monitor for signs and symptoms of infection  - Monitor lab/diagnostic results  - Monitor all insertion sites, i e  indwelling lines, tubes, and drains  - Administer medications as ordered  - Instruct and encourage patient and family to use good hand hygiene technique  - Identify and instruct in appropriate isolation precautions for identified infection/condition   Outcome: Progressing    Goal: Absence of fever/infection during neutropenic period  INTERVENTIONS:  - Monitor WBC     Outcome: Completed Date Met: 07/20/18      Problem: SAFETY ADULT  Goal: Maintain or return to baseline ADL function  INTERVENTIONS:  -  Assess patient's ability to carry out ADLs; assess patient's baseline for ADL function and identify physical deficits which impact ability to perform ADLs (bathing, care of mouth/teeth, toileting, grooming, dressing, etc )  - Assess/evaluate cause of self-care deficits   - Assess range of motion  - Assess patient's mobility; develop plan if impaired  - Assess patient's need for assistive devices and provide as appropriate  - Encourage maximum independence but intervene and supervise when necessary  ¯ Involve family in performance of ADLs  ¯ Assess for home care needs following discharge   ¯ Request OT consult to assist with ADL evaluation and planning for discharge  ¯ Provide patient education as appropriate   Outcome: Progressing    Goal: Maintain or return mobility status to optimal level  INTERVENTIONS:  - Assess patient's baseline mobility status (ambulation, transfers, stairs, etc )    - Identify cognitive and physical deficits and behaviors that affect mobility  - Identify mobility aids required to assist with transfers and/or ambulation (gait belt, sit-to-stand, lift, walker, cane, etc )  - [place] fall precautions as indicated by assessment  - Record patient progress and toleration of activity level on Mobility SBAR; progress patient to next Phase/Stage  - Instruct patient to call for assistance with activity based on assessment  - Request Rehabilitation consult to assist with strengthening/weightbearing, etc    Outcome: Progressing      Problem: DISCHARGE PLANNING  Goal: Discharge to home or other facility with appropriate resources  INTERVENTIONS:  - Identify barriers to discharge w/patient and caregiver  - Arrange for needed discharge resources and transportation as appropriate  - Identify discharge learning needs (meds, wound care, etc )  - Arrange for interpretive services to assist at discharge as needed  - Refer to Case Management Department for coordinating discharge planning if the patient needs post-hospital services based on physician/advanced practitioner order or complex needs related to functional status, cognitive ability, or social support system   Outcome: Progressing      Problem: Knowledge Deficit  Goal: Patient/family/caregiver demonstrates understanding of disease process, treatment plan, medications, and discharge instructions  Complete learning assessment and assess knowledge base    Interventions:  - Provide teaching at level of understanding  - Provide teaching via preferred learning methods   Outcome: Progressing      Problem: Prexisting or High Potential for Compromised Skin Integrity  Goal: Skin integrity is maintained or improved  INTERVENTIONS:  - Identify patients at risk for skin breakdown  - Assess and monitor skin integrity  - Assess and monitor nutrition and hydration status  - Monitor labs (i e  albumin)  - Assess for incontinence   - Turn and reposition patient  - Assist with mobility/ambulation  - Relieve pressure over bony prominences  - Avoid friction and shearing  - Provide appropriate hygiene as needed including keeping skin clean and dry  - Evaluate need for skin moisturizer/barrier cream  - Collaborate with interdisciplinary team (i e  Nutrition, Rehabilitation, etc )   - Patient/family teaching   Outcome: Progressing      Problem: DISCHARGE PLANNING - CARE MANAGEMENT  Goal: Discharge to post-acute care or home with appropriate resources  INTERVENTIONS:  - Conduct assessment to determine patient/family and health care team treatment goals, and need for post-acute services based on payer coverage, community resources, and patient preferences, and barriers to discharge  - Address psychosocial, clinical, and financial barriers to discharge as identified in assessment in conjunction with the patient/family and health care team  - Arrange appropriate level of post-acute services according to patient's   needs and preference and payer coverage in collaboration with the physician and health care team  - Communicate with and update the patient/family, physician, and health care team regarding progress on the discharge plan  - Arrange appropriate transportation to post-acute venues   Outcome: Progressing

## 2018-07-20 NOTE — ASSESSMENT & PLAN NOTE
There was suspicion of sepsis, possible pneumonia on admission, patient has a low procalcitonin level, patient was just likely anemic/volume depleted on arrival, discontinue antibiotics  Sepsis has been ruled out

## 2018-07-20 NOTE — ASSESSMENT & PLAN NOTE
-case discussed with Hematology/Oncology  Plan is transfer to [address] [place] [address] under the service of Dr Jw Correa, I spoke at length with the excepting physician today, they will expect the patient to arrive on Monday 7/23/2018  Stable discuss radiation and chemotherapy treatment at the time of arrival at [facility]

## 2018-07-20 NOTE — CONSULTS
Discussed with Steff Berkowitz RN BSN [facility]OC[facility] about the wound management for the patient   Dr Shu Arciniega aware of the consult for acute surgery   Acute surgery will manage the wound   Wound care will sign off    Wang Tom RN BSN [facility] Reese

## 2018-07-20 NOTE — CONSULTS
Consultation - General Surgery   [facility] 56 y o  female MRN: [id number]  Unit/Bed#: 403-01 Encounter: [id number]    Assessment/Plan     Assessment:  A 61-year-old female admitted with lethargy, bleeding breast mass, left arm swelling, and found to have fungating/ulcerating left breast lesion and CT evidence of metastatic disease  On exam there is a very large ulcerated breast lesion on the left taking of literally almost a quarter of her breast   In addition to matted hard left axillary roll for lymphadenopathy and associated left upper extremity edema  There was active oozing from the left breast    Plan:  - serial H&H, transfuse as necessary  - patient with stage IV breast cancer and noted left ulcerated and bleeding left breast mass with CT evidence of invasion into the underlying ribs  Height highly recommend transfer to a tertiary care center either [facility] versus [facility] where she initially was being worked up  Patient was initially on blood thinners at outside hospital for the left arm which was noted to have a DVT as per the patient  Recommend stopping anticoagulation for now  If the mass of the left breast continues to bleed would recommend consultation by and radiation oncology for potential radiation therapy to control the bleeding  I do not routinely practice breast surgery but based on clinical and physical examination in addition to CT imaging very unlikely that this patient will be a candidate for any type of surgical intervention   - Case was discussed with the covering medical physician addition to hematology oncology, Dr Mikayla Gordon, who will be seeing her on Friday  History of Present Illness     HPI:  [facility] is a 56 y o  female with a history of hypertension, hyperlipidemia, presents to the ER with overall not feeling well and worsening of left arm swelling   Patient states she was initially seen at [facility] for a left breast mass underwent a biopsy which showed cancer of the left breast   At that time she was told she would need a MediPort and unlikely received chemotherapy and possibly surgery followed by radiation  She is scheduled to undergo PET scan and did not receive any further imaging at that time  Unfortunately her left arm became swollen she went back to the hospital during the day that she was supposed to have her PET scan and subsequently never had her PET scan  She was diagnosed with a left upper extremity blood clot was started on anticoagulation  Since that time the patient states that the left breast mass continued to bleed and she started feeling really terrible and decided to come to the ER here minus for further evaluation  Denies any nausea vomiting  No fevers or chills  States that she has had this breast mass for quite some time  She was initially seen back in late 2017 by Surgical Oncology here at [address] and was initially ordered to undergo a biopsy of the left breast however she did not follow up  Denies any abdominal pain  No chest pain no shortness of breath  She has a CT scan on admission showing very invasive breast cancer involving the left breast and invading down into dissected and destroying ribs 3 and 4, in addition to evidence of metastatic disease  Consults    Review of Systems     A 10 point review systems was conducted, all negative except as noted above HPI      Historical Information   Past Medical History:   Diagnosis Date    Arm fracture, left     casted    Blood clot in vein     left arm    Breast cancer, stage 3 (HCC)     Bronchitis     Chronic tonsillitis     History of abnormal mammogram 11/2017    Followed by Harmony Ervin and Dr Kodi Sloan History of Papanicolaou smear of cervix 06/2017    Followed by Jerad Segundo    Hyperlipidemia     Hypertension     Left sided sciatica     Meningitis     Mitral regurgitation     Muscle strain of left lower leg      Past Surgical History:   Procedure Laterality Date    APPENDECTOMY      BREAST SURGERY Left 12/2017    cyst removed    TONSILLECTOMY       Social History   History   Alcohol Use No     History   Drug Use No     History   Smoking Status    Former Smoker    Packs/day: 0 25    Quit date: 6/27/2018   Smokeless Tobacco    Never Used     Family History: non-contributory    Meds/Allergies   all current active meds have been reviewed  Allergies   Allergen Reactions    Bee Venom        Objective   First Vitals:   Blood Pressure: 134/63 (07/18/18 1424)  Pulse: (!) 113 (07/18/18 1424)  Temperature: 97 6 °F (36 4 °C) (07/18/18 1424)  Temp Source: Temporal (07/18/18 1424)  Respirations: 22 (07/18/18 1424)  Height: 5' 2" (157 5 cm) (07/18/18 2102)  Weight - Scale: (S) 63 5 kg (140 lb) (07/18/18 1424)  SpO2: 100 % (07/18/18 1424)    Current Vitals:   Blood Pressure: 92/55 (07/20/18 0717)  Pulse: 88 (07/20/18 0717)  Temperature: (!) 96 8 °F (36 °C) (07/20/18 0717)  Temp Source: Temporal (07/20/18 0717)  Respirations: 18 (07/20/18 0717)  Height: 5' 2" (157 5 cm) (07/18/18 2102)  Weight - Scale: 64 1 kg (141 lb 5 oz) (07/18/18 2102)  SpO2: 100 % (07/20/18 0717)      Intake/Output Summary (Last 24 hours) at 07/20/18 1011  Last data filed at 07/20/18 0816   Gross per 24 hour   Intake             2195 ml   Output             1400 ml   Net              795 ml       Invasive Devices     Peripheral Intravenous Line            Peripheral IV 07/19/18 Right Antecubital less than 1 day                Physical Exam  General:  No acute distress, resting comfortably  HEENT:  Normocephalic, atraumatic  CV:  S1-S2 audible, regular rate and rhythm  Pulmonary:  Clear auscultation bilaterally,  Breast/chest wall there is a large ulcerated bleeding mass of the left breast involving almost a quarter of her breasts of the upper outer quadrant  There palpable matted lymph nodes of the left axilla and also noted left upper extremity swelling    GI: Abdomen soft nontender  MSK:  Lower extremities without clubbing cyanosis or edema  There is significant swelling of the left upper extremity  Neurologic:  Alert orient x3, cranial 2-12 grossly intact  Psych:  Mood and affect are appropriate patient seems upset however I do not think she really understands the gravity of the situation  Lab Results: I have personally reviewed pertinent lab results  Imaging: I have personally reviewed pertinent films in PACS  EKG, Pathology, and Other Studies: I have personally reviewed pertinent reports

## 2018-07-20 NOTE — ASSESSMENT & PLAN NOTE
Patient with ongoing relatively low blood pressure, discontinue ACE-inhibitor and monitor blood pressure off of all meds, treatment of hypertension given her underlying severe disease should not be the priority  If blood pressure were to remain elevated above a systolic pressure of 515 or diastolic greater than 90 I would consider the addition of antihypertensive meds

## 2018-07-20 NOTE — PROGRESS NOTES
Progress Note Jairo Andres [birth year], 56 y o  female MRN: [id number]    Unit/Bed#: 403-01 Encounter: [id number]    Primary Care Provider: CLINTON [place]   Date and time admitted to hospital: 7/18/2018  2:13 PM        * Acute blood loss anemia   Assessment & Plan      -the acute blood loss anemia secondary to bleeding from her left breast wound, patient reports significant blood loss from left breast wound  -patient received 2 units packed red blood cells, monitor H&H every 8 hours  Case was discussed Interventional Radiology, they did not feel that anticoagulation was necessary, it did not think that SVC filter was needed  Acute deep vein thrombosis (DVT) of left upper extremity (HCC)   Assessment & Plan    -hold anticoagulation for now in the setting of acute blood loss anemia    -case discussed with Oncology  Sepsis ([facility] [address] )   Assessment & Plan    There was suspicion of sepsis, possible pneumonia on admission, patient has a low procalcitonin level, patient was just likely anemic/volume depleted on arrival, discontinue antibiotics  Sepsis has been ruled out  Metastatic breast cancer [facility])   Assessment & Plan    -case discussed with Hematology/Oncology  Plan is transfer to [address] [place] [address] under the service of Dr Socorro Millan, I spoke at length with the excepting physician today, they will expect the patient to arrive on Monday 7/23/2018  Stable discuss radiation and chemotherapy treatment at the time of arrival at [facility]          Type 2 myocardial infarction without ST elevation (HCC)   Assessment & Plan    Elevated troponin, likely non ST elevated myocardial infarction type 2 in the setting of volume depletion/severe anemia        Essential hypertension   Assessment & Plan    Patient with ongoing relatively low blood pressure, discontinue ACE-inhibitor and monitor blood pressure off of all meds, treatment of hypertension given her underlying severe disease should not be the priority  If blood pressure were to remain elevated above a systolic pressure of 554 or diastolic greater than 90 I would consider the addition of antihypertensive meds  Breast wound, left, sequela   Assessment & Plan    Local wound care, appreciate surgical evaluation  Hyperglycemia   Assessment & Plan    -check a HgbA1c level  -outpatient follow-up with her PCP in regards to this matter        Vitamin D deficiency   Assessment & Plan    -continue vitamin D supplementation  -outpatient follow-up with her PCP in regards to this matter            VTE Pharmacologic Prophylaxis:   Pharmacologic: Pharmacologic VTE Prophylaxis contraindicated due to Bleeding from left breast  Mechanical VTE Prophylaxis in Place: Yes    Patient Centered Rounds: I have performed bedside rounds with nursing staff today  Discussions with Specialists or Other Care Team Provider:  Plan of care discussed today with inpatient surgical team and with Hematology Oncology here [address]  Transfer was also discussed with the oncology team at [facility]     Education and Discussions with Family / Patient:  Plan of care discussed with patient and her boyfriend at bedside    Time Spent for Care: 1 hour  More than 50% of total time spent on counseling and coordination of care as described above  Current Length of Stay: 2 day(s)    Current Patient Status: Inpatient   Certification Statement: The patient will continue to require additional inpatient hospital stay due to Patient still with active bleeding, needs continuous monitoring of her hemoglobin level, patient has advanced metastatic breast cancer and will require inpatient radiation treatment and chemotherapy  Discharge Plan / Estimated Discharge Date:  Plan is for transfer to [facility] on 7/23/2018      Code Status: Level 1 - Full Code      Subjective:   No pain, no shortness of breath, okay p o  intake      Objective: Vitals:   Temp (24hrs), Av 8 °F (36 6 °C), Min:96 8 °F (36 °C), Max:98 5 °F (36 9 °C)    HR:  [] 88  Resp:  [18-22] 18  BP: ()/(53-77) 92/55  SpO2:  [89 %-100 %] 100 %  Body mass index is 25 85 kg/m²  Input and Output Summary (last 24 hours): Intake/Output Summary (Last 24 hours) at 18 1435  Last data filed at 18 1345   Gross per 24 hour   Intake              900 ml   Output             1700 ml   Net             -800 ml       Physical Exam:     Physical Exam   Constitutional: She appears well-developed and well-nourished  No distress  HENT:   Head: Normocephalic and atraumatic  Mouth/Throat: Oropharynx is clear and moist  No oropharyngeal exudate  Cardiovascular: Normal rate  Pulmonary/Chest: Effort normal and breath sounds normal  No respiratory distress  She has no wheezes  Abdominal: Soft  Bowel sounds are normal  She exhibits no distension  There is no tenderness  Musculoskeletal: Normal range of motion  She exhibits edema (Left upper extremity)  Skin: She is not diaphoretic  Psychiatric: She has a normal mood and affect  Her behavior is normal  Judgment and thought content normal    Nursing note and vitals reviewed  Additional Data:     Labs:      Results from last 7 days  Lab Units 18  0511   WBC Thousand/uL 19 13*   HEMOGLOBIN g/dL 7 3*   HEMATOCRIT % 23 1*   PLATELETS Thousands/uL 463*   LYMPHO PCT % 9*   MONO PCT MAN % 5   EOSINO PCT MANUAL % 0       Results from last 7 days  Lab Units 18  0511   SODIUM mmol/L 135*   POTASSIUM mmol/L 3 9   CHLORIDE mmol/L 104   CO2 mmol/L 25   BUN mg/dL 11   CREATININE mg/dL 0 73   CALCIUM mg/dL 8 1*   TOTAL PROTEIN g/dL 5 1*   BILIRUBIN TOTAL mg/dL 0 40   ALK PHOS U/L 54   ALT U/L 13   AST U/L 10   GLUCOSE RANDOM mg/dL 130       Results from last 7 days  Lab Units 18  1439   INR  1 34*       * I Have Reviewed All Lab Data Listed Above  * Additional Pertinent Lab Tests Reviewed:  All Labs For Current Hospital Admission Reviewed        Recent Cultures (last 7 days):       Results from last 7 days  Lab Units 07/19/18  1003 07/19/18  0214 07/18/18  2220   BLOOD CULTURE   --   --  No Growth at 24 hrs  No Growth at 24 hrs     URINE CULTURE  No Growth <1000 cfu/mL  --   --    LEGIONELLA URINARY ANTIGEN   --  Negative  --        Last 24 Hours Medication List:     Current Facility-Administered Medications:  acetaminophen 650 mg Oral Q6H PRN Nino Boot, DO   aspirin 81 mg Oral Daily Nino Boot, DO   atorvastatin 40 mg Oral Daily With Owen & Hugh, DO   calcium carbonate-vitamin D 1 tablet Oral BID With Meals Nino Boot, DO   cyanocobalamin 500 mcg Oral Daily Nino Boot, DO   famotidine 20 mg Oral BID Nino Boot, DO   fluticasone 1 spray Each Nare Daily Nino Boot, DO   loratadine 10 mg Oral Daily Nino Boot, DO   melatonin 3 mg Oral HS Nino Boot, DO   morphine injection 2 mg Intravenous Q3H PRN Nino Boot, DO   multivitamin-minerals 1 tablet Oral Daily Nino Boot, DO   ondansetron 4 mg Intravenous Q6H PRN Nino Boot, DO   oxyCODONE 5 mg Oral Q4H PRN Nino Boot, DO        Today, Patient Was Seen By: Jarrett Estrada DO

## 2018-07-20 NOTE — PHYSICAL THERAPY NOTE
PT Treatment     07/20/18 1056   Pain Assessment   Pain Score 3   Pain Location Breast   Pain Orientation Left   Restrictions/Precautions   Other Precautions Contact/isolation; Fall Risk;Pain;Telemetry; Bed Alarm   Subjective   Subjective Pt aggreeable to ambulate and complains of breast soreness and drainage  Bed Mobility   Supine to Sit 7  Independent   Sit to Supine 7  Independent   Additional Comments no difficulty with bed mobility  pt on 2L's O2 at rest with SpO2 99% HR 77  Trialed ambulation on room air and post ambulatory SpO2 92%    Transfers   Sit to Stand 7  Independent   Stand to Sit 7  Independent   Stand pivot 7  Independent   Additional Comments No LOB during transfers or ambulation  Pt denied lightheadedness SOB and dizziness during ambulation  Ambulation/Elevation   Gait pattern Narrow UNIQUE; Short stride   Gait Assistance 5  Supervision   Assistive Device None   Distance 200ft no A D  Supervision no LOB or complaint during ambulation   Balance   Static Sitting Good   Dynamic Sitting Good   Static Standing Good   Dynamic Standing Fair +   Ambulatory Fair +   Endurance Deficit   Endurance Deficit Yes   Endurance Deficit Description limited ambulation tolerance due to weakness and fatigue   Activity Tolerance   Activity Tolerance Patient limited by fatigue   Assessment   Prognosis Good   Problem List Decreased strength;Decreased endurance; Impaired balance;Decreased mobility;Pain   Assessment Pt still performing all mobility transfers and ambulation at a (S) to (I) level however still demonstrates decreased endurance and LE weakness limited ambulation tolerance  Pt deferred sitting OOB in chair and returned to supine in bed  Pt in need of continued activity in PT to improve strength balance endurance ambulation and stair negotiation  Plan   Treatment/Interventions Functional transfer training;Bed mobility;Gait training;Patient/family training; Endurance training   Progress Progressing toward goals   Recommendation   Recommendation Home PT   PT - OK to Discharge No   no SCD's  Pt supine in bed with call bell in reach and bed alarm on

## 2018-07-20 NOTE — PROGRESS NOTES
Pt OOB to bathroom when assessed ABD was saturated with bright red blood  Assisted pt back to bed, loosened dressing, clotted gauze feel off of wound  Copious amounts of bright red blood running out of wound bed  New gauze applied, direct pressure held  Tabby OR notified and came to bedside to assess pt and redress wound  Pain medication given to pt  Vitals obtained  H&H obtained  2L NC applied per Dakota RO for saturations 89-91% on room air  Pt stable  Will continue to monitor closely

## 2018-07-20 NOTE — CONSULTS
Consultation - Lucas King 56 y o  female MRN: [id number]    Unit/Bed#: 403-01 Encounter: [id number]      Assessment/Plan     Assessment:  In summary, this is a 62-year-old female history of left breast cancer, with high suspicion for metastatic disease in the lungs, bone, gluteal region  Local invasion is noted  Report of left upper extremity DVT  Anticoagulation is relatively contraindicated due to left breast mass bleeding  Treatment for her cancer could include radiation therapy to diminish local bleeding problems  Chemotherapy could be considered although the response rate would be lower than for radiation therapy  Chemo would, however, carry the advantage of systemic efficacy  I reviewed with the patient that the goal of treatment is palliation with potential for survival benefit but that cure is quite unlikely  The patient has many social, logistics, geographic issues to deal with  She is inclined to proceed with radiation therapy for palliative purposes and would then consider whether to proceed with systemic therapy or not  We reviewed some of the pros and cons of each of these approaches  I highly recommended to her to maintain her care in 1 hospital system as her care has, to date, been fragmented with interactions at at least 3 if not 4 different systems  I also reviewed the above with the hospitalist physicians  Plan:  See above  History of Present Illness     HPI: Lucas King is a 56y o  year old female who presents with left arm swelling and shortness of breath  Patient presented in December 2017 with left breast abnormality  This had enlarged over short  Of time  She was seen in Surgical Oncology and biopsy was advocated but not carried out  She was lost to follow-up  She was evaluated in [facility] in March 2018 and encouraged to proceed with mammogram and biopsy but declined  Ultimately she sought care in [facility] as well as [facility]    She reports that she had a biopsy and was told that she has breast cancer  Chemotherapy was recommended  She subsequently and recently developed left arm swelling prompting admission at our facility  CT scan chest abdomen pelvis showed left breast mass invading the chest wall  Multifocal pulmonary nodules, moderate left pleural effusion, by alert bilateral hilar lymphadenopathy centrally necrotic right gluteal lesion, and L2 vertebral body lucency are all noted  Line apparently at an outside facility she had a Doppler of the left arm showing venous thrombosis and was started on Lovenox  This precipitated bleeding from her left breast lesion resulting in severe anemia, requiring transfusions  WBC 24 1, hemoglobin 6 1, MCV 73, platelet count 953   83 segs, 3 bands, 9 lymphs, 5 monos  CMP shows albumin 2 0, calcium 8 1, otherwise normal     Inpatient consult to Hematology  Consult performed by: Serg Stone ordered by: Ravinder Healy          Review of Systems   Constitutional: Negative for appetite change, diaphoresis, fatigue and fever  HENT: Negative for sinus pain  Eyes: Negative for discharge  Respiratory: Positive for shortness of breath  Negative for cough  Cardiovascular: Negative for chest pain  Gastrointestinal: Negative for abdominal pain, constipation and diarrhea  Endocrine: Negative for cold intolerance  Genitourinary: Negative for difficulty urinating and hematuria  Musculoskeletal: Positive for joint swelling (Left arm swelling  )  Skin: Negative for rash  Allergic/Immunologic: Negative for environmental allergies  Neurological: Negative for dizziness and headaches  Hematological: Negative for adenopathy  Psychiatric/Behavioral: Negative for agitation         Historical Information   Past Medical History:   Diagnosis Date    Arm fracture, left     casted    Blood clot in vein     left arm    Breast cancer, stage 3 (HCC)     Bronchitis     Chronic tonsillitis     History of abnormal mammogram 11/2017    Followed by Shea Tee and Dr Paige Reading History of Papanicolaou smear of cervix 06/2017    Followed by Rissa Bird    Hyperlipidemia     Hypertension     Left sided sciatica     Meningitis     Mitral regurgitation     Muscle strain of left lower leg      Past Surgical History:   Procedure Laterality Date    APPENDECTOMY      BREAST SURGERY Left 12/2017    cyst removed    TONSILLECTOMY       Social History   History   Alcohol Use No     History   Drug Use No     History   Smoking Status    Former Smoker    Packs/day: 0 25    Quit date: 6/27/2018   Smokeless Tobacco    Never Used     Family History:   Family History   Problem Relation Age of Onset   [facility] Breast cancer Mother     Diabetes Maternal Grandfather     Breast cancer Family     Uterine cancer Family     Hypertension Family     Hypertension Father     Hyperlipidemia Father     Diabetes Maternal Grandmother        Meds/Allergies   all current active meds have been reviewed  Allergies   Allergen Reactions    Bee Venom        Objective   Vitals: Blood pressure 92/55, pulse 88, temperature (!) 96 8 °F (36 °C), temperature source Temporal, resp  rate 18, height 5' 2" (1 575 m), weight 64 1 kg (141 lb 5 oz), SpO2 100 %  Intake/Output Summary (Last 24 hours) at 07/20/18 1235  Last data filed at 07/20/18 0816   Gross per 24 hour   Intake              910 ml   Output             1400 ml   Net             -490 ml     Invasive Devices     Peripheral Intravenous Line            Peripheral IV 07/19/18 Right Antecubital less than 1 day                Physical Exam   Constitutional: She is oriented to person, place, and time  She appears well-developed  HENT:   Head: Normocephalic  Eyes: Pupils are equal, round, and reactive to light  Neck: Neck supple  Cardiovascular: Normal rate  No murmur heard  Pulmonary/Chest: No respiratory distress  She has no wheezes  She has no rales     Left breast mass w cutaneous erosion  Abdominal: Soft  She exhibits no distension  There is no tenderness  There is no rebound  Musculoskeletal: She exhibits no edema  Lymphadenopathy:     She has no cervical adenopathy  Neurological: She is alert and oriented to person, place, and time  She displays normal reflexes  Skin: Skin is warm  No rash noted  Psychiatric: She has a normal mood and affect  Thought content normal        Lab Results: I have personally reviewed pertinent reports  Imaging Studies: I have personally reviewed pertinent reports  EKG, Pathology, and Other Studies: I have personally reviewed pertinent reports  Code Status: Level 1 - Full Code  Advance Directive and Living Will:      Power of :    POLST:      Counseling / Coordination of Care  Total floor / unit time spent today 40 minutes  Greater than 50% of total time was spent with the patient and / or family counseling and / or coordination of care  A description of the counseling / coordination of care: see note

## 2018-07-20 NOTE — PROGRESS NOTES
Periodic assessments of left breast dressing reveal no active bleeding as of 0220  Ragini RO at bedside to reassess pt at [phone]  Dressing assessed approximately q30min  VSS throughout frequent monitoring with BP maintaining 100's/60's HR in the 90's  Ragini RO aware of vitals  Pt resting comfortably and offers no complaints  Will continue to monitor

## 2018-07-20 NOTE — ASSESSMENT & PLAN NOTE
Elevated troponin, likely non ST elevated myocardial infarction type 2 in the setting of volume depletion/severe anemia

## 2018-07-20 NOTE — ASSESSMENT & PLAN NOTE
-the acute blood loss anemia secondary to bleeding from her left breast wound, patient reports significant blood loss from left breast wound  -patient received 2 units packed red blood cells, monitor H&H every 8 hours  Case was discussed Interventional Radiology, they did not feel that anticoagulation was necessary, it did not think that SVC filter was needed

## 2018-07-20 NOTE — ASSESSMENT & PLAN NOTE
-hold anticoagulation for now in the setting of acute blood loss anemia    -case discussed with Oncology

## 2018-07-21 VITALS
TEMPERATURE: 98.8 F | WEIGHT: 141.31 LBS | SYSTOLIC BLOOD PRESSURE: 131 MMHG | RESPIRATION RATE: 19 BRPM | BODY MASS INDEX: 26.01 KG/M2 | HEIGHT: 62 IN | OXYGEN SATURATION: 97 % | DIASTOLIC BLOOD PRESSURE: 62 MMHG | HEART RATE: 94 BPM

## 2018-07-21 LAB
ABO GROUP BLD BPU: NORMAL
ALBUMIN SERPL BCP-MCNC: 1.9 G/DL (ref 3.5–5)
ALP SERPL-CCNC: 55 U/L (ref 46–116)
ALT SERPL W P-5'-P-CCNC: 16 U/L (ref 12–78)
ANION GAP SERPL CALCULATED.3IONS-SCNC: 6 MMOL/L (ref 4–13)
ANISOCYTOSIS BLD QL SMEAR: PRESENT
AST SERPL W P-5'-P-CCNC: 16 U/L (ref 5–45)
BASOPHILS # BLD MANUAL: 0 THOUSAND/UL (ref 0–0.1)
BASOPHILS NFR MAR MANUAL: 0 % (ref 0–1)
BILIRUB SERPL-MCNC: 0.7 MG/DL (ref 0.2–1)
BPU ID: NORMAL
BUN SERPL-MCNC: 9 MG/DL (ref 5–25)
CALCIUM SERPL-MCNC: 8.3 MG/DL (ref 8.3–10.1)
CHLORIDE SERPL-SCNC: 104 MMOL/L (ref 100–108)
CO2 SERPL-SCNC: 27 MMOL/L (ref 21–32)
CREAT SERPL-MCNC: 0.68 MG/DL (ref 0.6–1.3)
CROSSMATCH: NORMAL
EOSINOPHIL # BLD MANUAL: 2.14 THOUSAND/UL (ref 0–0.4)
EOSINOPHIL NFR BLD MANUAL: 9 % (ref 0–6)
ERYTHROCYTE [DISTWIDTH] IN BLOOD BY AUTOMATED COUNT: 18.4 % (ref 11.6–15.1)
GFR SERPL CREATININE-BSD FRML MDRD: 99 ML/MIN/1.73SQ M
GIANT PLATELETS BLD QL SMEAR: PRESENT
GLUCOSE SERPL-MCNC: 112 MG/DL (ref 65–140)
HCT VFR BLD AUTO: 23.2 % (ref 34.8–46.1)
HCT VFR BLD AUTO: 24.9 % (ref 34.8–46.1)
HGB BLD-MCNC: 7.4 G/DL (ref 11.5–15.4)
HGB BLD-MCNC: 8.1 G/DL (ref 11.5–15.4)
HYPERCHROMIA BLD QL SMEAR: PRESENT
LYMPHOCYTES # BLD AUTO: 1.9 THOUSAND/UL (ref 0.6–4.47)
LYMPHOCYTES # BLD AUTO: 8 % (ref 14–44)
MAGNESIUM SERPL-MCNC: 1.6 MG/DL (ref 1.6–2.6)
MCH RBC QN AUTO: 26 PG (ref 26.8–34.3)
MCHC RBC AUTO-ENTMCNC: 31.9 G/DL (ref 31.4–37.4)
MCV RBC AUTO: 81 FL (ref 82–98)
MONOCYTES # BLD AUTO: 1.19 THOUSAND/UL (ref 0–1.22)
MONOCYTES NFR BLD: 5 % (ref 4–12)
NEUTROPHILS # BLD MANUAL: 18.56 THOUSAND/UL (ref 1.85–7.62)
NEUTS SEG NFR BLD AUTO: 78 % (ref 43–75)
PHOSPHATE SERPL-MCNC: 3.6 MG/DL (ref 2.7–4.5)
PLATELET # BLD AUTO: 432 THOUSANDS/UL (ref 149–390)
PLATELET BLD QL SMEAR: ABNORMAL
PMV BLD AUTO: 9.5 FL (ref 8.9–12.7)
POLYCHROMASIA BLD QL SMEAR: PRESENT
POTASSIUM SERPL-SCNC: 3.9 MMOL/L (ref 3.5–5.3)
PROT SERPL-MCNC: 5.1 G/DL (ref 6.4–8.2)
RBC # BLD AUTO: 2.85 MILLION/UL (ref 3.81–5.12)
SODIUM SERPL-SCNC: 137 MMOL/L (ref 136–145)
TOTAL CELLS COUNTED SPEC: 100
UNIT DISPENSE STATUS: NORMAL
UNIT PRODUCT CODE: NORMAL
UNIT RH: NORMAL
WBC # BLD AUTO: 23.8 THOUSAND/UL (ref 4.31–10.16)

## 2018-07-21 PROCEDURE — 85014 HEMATOCRIT: CPT | Performed by: INTERNAL MEDICINE

## 2018-07-21 PROCEDURE — 84100 ASSAY OF PHOSPHORUS: CPT | Performed by: INTERNAL MEDICINE

## 2018-07-21 PROCEDURE — 85027 COMPLETE CBC AUTOMATED: CPT | Performed by: INTERNAL MEDICINE

## 2018-07-21 PROCEDURE — 85018 HEMOGLOBIN: CPT | Performed by: INTERNAL MEDICINE

## 2018-07-21 PROCEDURE — 80053 COMPREHEN METABOLIC PANEL: CPT | Performed by: INTERNAL MEDICINE

## 2018-07-21 PROCEDURE — 85007 BL SMEAR W/DIFF WBC COUNT: CPT | Performed by: INTERNAL MEDICINE

## 2018-07-21 PROCEDURE — P9021 RED BLOOD CELLS UNIT: HCPCS

## 2018-07-21 PROCEDURE — 83735 ASSAY OF MAGNESIUM: CPT | Performed by: INTERNAL MEDICINE

## 2018-07-21 PROCEDURE — 99239 HOSP IP/OBS DSCHRG MGMT >30: CPT | Performed by: INTERNAL MEDICINE

## 2018-07-21 RX ORDER — FAMOTIDINE 20 MG/1
20 TABLET, FILM COATED ORAL 2 TIMES DAILY
Refills: 0
Start: 2018-07-21

## 2018-07-21 RX ORDER — ACETAMINOPHEN 325 MG/1
650 TABLET ORAL EVERY 6 HOURS PRN
Refills: 0
Start: 2018-07-21

## 2018-07-21 RX ORDER — SODIUM CHLORIDE 9 MG/ML
100 INJECTION, SOLUTION INTRAVENOUS CONTINUOUS
Refills: 0
Start: 2018-07-21

## 2018-07-21 RX ORDER — ATORVASTATIN CALCIUM 40 MG/1
40 TABLET, FILM COATED ORAL
Refills: 0
Start: 2018-07-21

## 2018-07-21 RX ORDER — MAGNESIUM SULFATE HEPTAHYDRATE 40 MG/ML
2 INJECTION, SOLUTION INTRAVENOUS ONCE
Status: COMPLETED | OUTPATIENT
Start: 2018-07-21 | End: 2018-07-21

## 2018-07-21 RX ORDER — SODIUM CHLORIDE 9 MG/ML
100 INJECTION, SOLUTION INTRAVENOUS CONTINUOUS
Status: DISCONTINUED | OUTPATIENT
Start: 2018-07-21 | End: 2018-07-21 | Stop reason: HOSPADM

## 2018-07-21 RX ADMIN — CYANOCOBALAMIN TAB 500 MCG 500 MCG: 500 TAB at 09:45

## 2018-07-21 RX ADMIN — OXYCODONE HYDROCHLORIDE 5 MG: 5 TABLET ORAL at 05:23

## 2018-07-21 RX ADMIN — MAGNESIUM SULFATE HEPTAHYDRATE 2 G: 40 INJECTION, SOLUTION INTRAVENOUS at 12:35

## 2018-07-21 RX ADMIN — MORPHINE SULFATE 2 MG: 2 INJECTION, SOLUTION INTRAMUSCULAR; INTRAVENOUS at 07:11

## 2018-07-21 RX ADMIN — HYDROMORPHONE HYDROCHLORIDE 1 MG: 1 INJECTION, SOLUTION INTRAMUSCULAR; INTRAVENOUS; SUBCUTANEOUS at 07:41

## 2018-07-21 RX ADMIN — MULTIPLE VITAMINS W/ MINERALS TAB 1 TABLET: TAB at 09:45

## 2018-07-21 RX ADMIN — LORATADINE 10 MG: 10 TABLET ORAL at 09:55

## 2018-07-21 RX ADMIN — CALCIUM CARBONATE-VITAMIN D TAB 500 MG-200 UNIT 1 TABLET: 500-200 TAB at 08:30

## 2018-07-21 RX ADMIN — FAMOTIDINE 20 MG: 20 TABLET ORAL at 18:04

## 2018-07-21 RX ADMIN — CALCIUM CARBONATE-VITAMIN D TAB 500 MG-200 UNIT 1 TABLET: 500-200 TAB at 18:04

## 2018-07-21 RX ADMIN — SODIUM CHLORIDE 1000 ML: 0.9 INJECTION, SOLUTION INTRAVENOUS at 07:45

## 2018-07-21 RX ADMIN — ATORVASTATIN CALCIUM 40 MG: 40 TABLET, FILM COATED ORAL at 18:05

## 2018-07-21 RX ADMIN — FAMOTIDINE 20 MG: 20 TABLET ORAL at 09:45

## 2018-07-21 RX ADMIN — CEFEPIME HYDROCHLORIDE 2000 MG: 2 INJECTION, SOLUTION INTRAVENOUS at 18:07

## 2018-07-21 RX ADMIN — SODIUM CHLORIDE 100 ML/HR: 0.9 INJECTION, SOLUTION INTRAVENOUS at 11:52

## 2018-07-21 NOTE — ASSESSMENT & PLAN NOTE
-the patient will be transferred to a higher level of care today, 07/21/2018, at [facility] for probably radiation therapy and possible chemotherapy per Dr Beau Vera (Hematology-Oncology)  -the patient was accepted on the service of Dr Samson Mccoy ([address] Attending Physician) at [facility]

## 2018-07-21 NOTE — DISCHARGE SUMMARY
Discharge- Hilda Grady [birth year], 56 y o  female MRN: [id number]    Unit/Bed#:  Encounter: [id number]    Primary Care Provider: Carmell Fleischer, CRNP   Date and time admitted to hospital: 7/18/2018  2:13 PM        * Metastatic breast cancer [facility])   Assessment & Plan    -the patient will be transferred to a higher level of care today, 07/21/2018, at [facility] for probably radiation therapy and possible chemotherapy per Dr Octavio Alejandro (Hematology-Oncology)  -the patient was accepted on the service of Dr Jonh Stone ([address] Attending Physician) at [facility]        Sepsis [facility])   Assessment & Plan    -There was suspicion of sepsis, possible pneumonia on admission  -The patient has a low procalcitonin level  -The patient was initially treated with broad-spectrum antibiotics  -Restart IV cefepime and IV vancomycin today, 07/21/2018, with the worsening leukocytosis and episode of hypotension  -Culture results so far are as follows:  Microbiology Results (last 21 days)     Procedure Component Value - Date/Time   Urine culture [[id number]] Collected: 07/19/18 1003   Lab Status: Final result Specimen: Urine from Urine, Clean Catch Updated: 07/20/18 0845    Urine Culture No Growth <1000 cfu/mL   Strep Pneumoniae, Urine [[id number]] (Normal) Collected: 07/19/18 0214   Lab Status: Final result Specimen: Urine from Urine, Clean Catch Updated: 07/19/18 0904    Strep pneumoniae antigen, urine Negative   Blood culture [[id number]] Collected: 07/18/18 2220   Lab Status: Preliminary result Specimen: Blood from Arm, Right Updated: 07/21/18 0801    Blood Culture No Growth at 48 hrs  Blood culture [[id number]] Collected: 07/18/18 2220   Lab Status: Preliminary result Specimen: Blood from Arm, Right Updated: 07/21/18 0801    Blood Culture No Growth at 48 hrs     Occult blood x 3, stool [[id number]]    Lab Status: No result Specimen: Stool    MRSA culture [[id number]] (Normal) Collected: 07/18/18 2105   Lab Status: Final result Specimen: Nares from Nose Updated: 07/20/18 0570    MRSA Culture Only No Methicillin Resistant Staphlyococcus aureus (MRSA) isolated               Acute blood loss anemia   Assessment & Plan      -The acute blood loss anemia secondary to bleeding from her left breast wound and secondary to abdominal wall bleeding from home lovenox injections   -The patient has received a total of 2 Units of PRBC's during the hospitalization and will receive an additional 2 Units of PRBC's transfused today, 07/21/2018     -The case was discussed Interventional Radiology in regards to the left upper extremity DVT  -Interventional Radiology did not feel that anticoagulation was necessary and did not think that an SVC filter was needed  A rapid response was called today, 07/21/2018, secondary to the patient having profuse bleeding from her left breast wound as well as hypotension  The patient was found to have a systolic blood pressure in the 60's mmHg  I ordered a NSS IV fluid bolus of 1 liter  She will be transfused 2 additional units of PRBC's  The patient will be transferred to the medical ICU at this time  I called the patient's emergency contact, Zabrina Ferrari (boyfriend), and updated him on the patient's current status  The patient was scheduled to be transferred to [facility] on Monday, 07/23/2018, for radiation therapy, but now she requires a more urgent transfer today to a higher level of care  The patient will be transferred to [facility] today, 07/21/2018, when a bed is available          Acute deep vein thrombosis (DVT) of left upper extremity (HCC)   Assessment & Plan    -hold anticoagulation for now in the setting of acute blood loss anemia              Essential hypertension   Assessment & Plan    -hold all hypertension medications in the setting of hypotension  -follow the blood pressure trend closely        Breast wound, left, sequela   Assessment & Plan    -the patient has recurrent bleeding from the left breast wound  -she requires a Surgical Oncology evaluation at a higher level of care  -the patient will be transferred to a higher level of care today, 07/21/2018, at [facility] for probably radiation therapy and possible chemotherapy per Dr Rosendo George (Hematology-Oncology)  -the patient was accepted on the service of Dr Deon Alicia ([address] Attending Physician) at [facility]            Type 2 Myocardial Infarction  Assessment & Plan  -secondary to acute blood loss anemia and hypotension  -outpatient follow-up with Cardiology for a cardiac ischemic work-up  -no anti-platelet therapy in the setting of active bleeding  -statin therapy was changed to high-intensity statin therapy with atorvastatin 40 mg daily in the setting of an NSTEMI Type 2    Vitamin D insufficiency  Assessment & Plan  -continue vitamin D supplementation  -outpatient follow-up with her PCP in regards to this matter      Discharging Physician / Practitioner: Frank العلي DO  PCP: Winsome Magallon  Admission Date: 07/18/18  Discharge Date: 07/21/18    Consultations During Hospital Stay:  · Hematology/Oncology  · General Surgery        Significant Findings / Test Results:     · CTA of chest/abdomen/pelvis (07/18/2018): IMPRESSION:     Irregular highly aggressive appearing left breast mass/neoplasm infiltrating into the left pleura and causing destruction of the left 4th and 5th ribs  Left axillary metastatic lymphadenopathy      Multifocal lung masses identified could represent metastatic disease and/or pneumonia         Moderate left pleural effusion  Left basilar consolidation representing atelectasis and/or pneumonia      Bilateral hilar lymphadenopathy/metastatic disease      No evidence of filling defect in the pulmonary trunk or main pulmonary arteries  Right lower lobe mass appears to infiltrate the right pulmonary arteries in the right lower lobe    Left hilar mass infiltrates and causes constriction/stenosis of the left   lobe are pulmonary arteries      Right gluteal centrally necrotic masses/metastatic disease  Nonspecific 1 2 cm nodule identified posterior to the left gluteus kellee muscle      Lucent lesion to the inferior anterior aspect of the L2 vertebral body could relate to metastatic disease  Complications:  None    Reason for Admission:  Acute blood loss anemia    Hospital Course: Pamela Ayoub is a 56 y o  female patient who originally presented to the hospital on 7/18/2018 due to bleeding from her abdominal wall secondary to home lovenox injections as well as bleeding from a left breast wound  Please see above list of diagnoses and related plan for additional information  Condition at Discharge: serious     Discharge Day Visit / Exam:     Subjective: The patient was seen and examined  A rapid response was called this morning secondary to uncontrollable bleeding at the site of the left breast wound  Vitals: Blood Pressure: 109/55 (07/21/18 1109)  Pulse: 103 (07/21/18 1109)  Temperature: 97 8 °F (36 6 °C) (07/21/18 1109)  Temp Source: Temporal (07/21/18 1109)  Respirations: 20 (07/21/18 1109)  Height: 5' 2" (157 5 cm) (07/18/18 2102)  Weight - Scale: 64 1 kg (141 lb 5 oz) (07/18/18 2102)  SpO2: 98 % (07/21/18 1109)  Exam:   Physical Exam  General:  NAD, awake, alert, oriented x 3  HEENT:  NC/AT, mucous membranes moist  Neck:  Supple, No JVP elevation  CV:  + S1, + S2, Tachycardic, Regular rhythm  Pulm:  Lung fields are CTA bilaterally  Abd:  Soft, Non-tender, Non-distended  Ext:  No clubbing/cyanosis/edema  Skin:  Bleeding left breast wound    Discussion with Family:  I updated the patient's boyfriend, Cortney Torres, on the telephone  Discharge instructions/Information to patient and family:   See after visit summary for information provided to patient and family        Provisions for Follow-Up Care:  See after visit summary for information related to follow-up care and any pertinent home health orders  Disposition:     [address] Transfer to [facility] on the service of Dr Ava Vasquez (Critical Care Attending Physician)  Planned Readmission:  [facility] Transfer to [facility] on the service of Dr Ava Vasquez (Critical Care Attending Physician)  Discharge Statement:  I spent 60 minutes discharging the patient  This time was spent on the day of discharge  I had direct contact with the patient on the day of discharge  Greater than 50% of the total time was spent examining patient, answering all patient questions, arranging and discussing plan of care with patient as well as directly providing post-discharge instructions  Additional time then spent on discharge activities  Discharge Medications:  See after visit summary for reconciled discharge medications provided to patient and family        ** Please Note: This note has been constructed using a voice recognition system **

## 2018-07-21 NOTE — NURSING NOTE
Patient received via bed into room 205, placed on monitor, patient alert and talkative   Dressing dry and intact left breast

## 2018-07-21 NOTE — SOCIAL WORK
The  Patient will be transported to [facility] for a higher level of care and I received a call from the transfer center about an auth for a transport I called [facility] and I spoke with Lia in the autherization department and she told me that there is no authorization needed for transport from a facility to facility  I spoke with Vincent Pang at the transfer center and she is aware of this

## 2018-07-21 NOTE — PROGRESS NOTES
Patient was found to have profuse bleeding from L breast wound  Pressure was applied  Patient c/o pain and morphine was given  Rapid response was called when patient c/o dizziness and bleeding was not stopping  Vitals were obtained and pressure was found to be low    See rapid response documentation

## 2018-07-21 NOTE — ASSESSMENT & PLAN NOTE
-There was suspicion of sepsis, possible pneumonia on admission  -The patient has a low procalcitonin level  -The patient was initially treated with broad-spectrum antibiotics  -Restart IV cefepime and IV vancomycin today, 07/21/2018, with the worsening leukocytosis and episode of hypotension  -Culture results so far are as follows:  Microbiology Results (last 21 days)     Procedure Component Value - Date/Time   Urine culture [[id number]] Collected: 07/19/18 1003   Lab Status: Final result Specimen: Urine from Urine, Clean Catch Updated: 07/20/18 0845    Urine Culture No Growth <1000 cfu/mL   Strep Pneumoniae, Urine [[id number]] (Normal) Collected: 07/19/18 0214   Lab Status: Final result Specimen: Urine from Urine, Clean Catch Updated: 07/19/18 0904    Strep pneumoniae antigen, urine Negative   Blood culture [[id number]] Collected: 07/18/18 2220   Lab Status: Preliminary result Specimen: Blood from Arm, Right Updated: 07/21/18 0801    Blood Culture No Growth at 48 hrs  Blood culture [[id number]] Collected: 07/18/18 2220   Lab Status: Preliminary result Specimen: Blood from Arm, Right Updated: 07/21/18 0801    Blood Culture No Growth at 48 hrs     Occult blood x 3, stool [[id number]]    Lab Status: No result Specimen: Stool    MRSA culture [[id number]] (Normal) Collected: 07/18/18 2105   Lab Status: Final result Specimen: Nares from Nose Updated: 07/20/18 1423    MRSA Culture Only No Methicillin Resistant Staphlyococcus aureus (MRSA) isolated

## 2018-07-21 NOTE — ASSESSMENT & PLAN NOTE
-hold all hypertension medications in the setting of hypotension  -follow the blood pressure trend closely

## 2018-07-21 NOTE — SOCIAL WORK
[facility] ambulance will transport the patient to [facility] and they will pick the patient up at 6 pm as the patient is getting blood at this time

## 2018-07-21 NOTE — ASSESSMENT & PLAN NOTE
-The acute blood loss anemia secondary to bleeding from her left breast wound and secondary to abdominal wall bleeding from home lovenox injections   -The patient has received a total of 2 Units of PRBC's during the hospitalization and will receive an additional 2 Units of PRBC's transfused today, 07/21/2018     -The case was discussed Interventional Radiology in regards to the left upper extremity DVT  -Interventional Radiology did not feel that anticoagulation was necessary and did not think that an SVC filter was needed  A rapid response was called today, 07/21/2018, secondary to the patient having profuse bleeding from her left breast wound as well as hypotension  The patient was found to have a systolic blood pressure in the 60's mmHg  I ordered a NSS IV fluid bolus of 1 liter  She will be transfused 2 additional units of PRBC's  The patient will be transferred to the medical ICU at this time  I called the patient's emergency contact, Keyshawn Hampton (boyfriend), and updated him on the patient's current status  The patient was scheduled to be transferred to [facility] on Monday, 07/23/2018, for radiation therapy, but now she requires a more urgent transfer today to a higher level of care  The patient will be transferred to [facility] today, 07/21/2018, when a bed is available

## 2018-07-21 NOTE — RAPID RESPONSE
A rapid response was called secondary to the patient having profuse bleeding from her left breast wound as well as hypotension  The patient was found to have a systolic blood pressure in the 60's mmHg  I ordered a NSS IV fluid bolus of 1 liter  She will be transfused 2 additional units of PRBC's  The patient will be transferred to the medical ICU at this time  I called the patient's emergency contact, Alesia Craig (boyfriend), and updated him on the patient's current status  The patient was scheduled to be transferred to [facility] on Monday, 07/23/2018, for radiation therapy, but now she requires a more urgent transfer today to a higher level of care  The patient will be transferred to [facility] today, 07/21/2018, when a bed is available

## 2018-07-21 NOTE — ASSESSMENT & PLAN NOTE
-the patient has recurrent bleeding from the left breast wound  -she requires a Surgical Oncology evaluation at a higher level of care  -the patient will be transferred to a higher level of care today, 07/21/2018, at [facility] for probably radiation therapy and possible chemotherapy per Dr Digna Worthy (Hematology-Oncology)  -the patient was accepted on the service of Dr Hood Evans ([address] Attending Physician) at [facility]

## 2018-07-21 NOTE — NURSING NOTE
Pt transferred via [facility] & transferred to [facility] w/pt

## 2018-07-24 LAB
BACTERIA BLD CULT: NORMAL
BACTERIA BLD CULT: NORMAL

## 2018-07-30 NOTE — CASE MANAGEMENT
Notification of Discharge  This is a Notification of Discharge from our facility [address]  Please be advised that this patient has been discharge from our facility  Below you will find the admission and discharge date and time including the patients disposition  PRESENTATION DATE: 7/18/2018  2:13 PM  IP ADMISSION DATE: 7/18/18 1903  DISCHARGE DATE: 7/21/2018  6:45 PM  DIS[facility]ITION: [facility]   [address]  [facility] in the [facility]  Phone: [phone]; Fax [phone]  ATTENTION: The Network Utilization Review Department is now centralized for our 9 Facilities  Make a note that we have a new phone and fax numbers for our Department  Please call with any questions or concerns to [phone] and carefully follow the prompts so that you are directed to the right person  All voicemails are confidential  Fax any determinations, approvals, denials, and requests for initial or continue stay review clinical to [phone]  Due to HIGH CALL volume, it would be easier if you could please send faxed requests to expedite your requests and in part, help us provide discharge notifications faster